# Patient Record
Sex: FEMALE | Race: BLACK OR AFRICAN AMERICAN | NOT HISPANIC OR LATINO | Employment: OTHER | URBAN - METROPOLITAN AREA
[De-identification: names, ages, dates, MRNs, and addresses within clinical notes are randomized per-mention and may not be internally consistent; named-entity substitution may affect disease eponyms.]

---

## 2020-08-17 ENCOUNTER — ANESTHESIA EVENT (OUTPATIENT)
Dept: SURGERY | Facility: OTHER | Age: 65
End: 2020-08-17
Payer: COMMERCIAL

## 2020-08-17 ENCOUNTER — HOSPITAL ENCOUNTER (OUTPATIENT)
Dept: PREADMISSION TESTING | Facility: OTHER | Age: 65
Discharge: HOME | End: 2020-08-17
Attending: ORTHOPAEDIC SURGERY
Payer: COMMERCIAL

## 2020-08-17 VITALS
WEIGHT: 212 LBS | BODY MASS INDEX: 34.07 KG/M2 | TEMPERATURE: 97 F | HEIGHT: 66 IN | DIASTOLIC BLOOD PRESSURE: 65 MMHG | HEART RATE: 60 BPM | SYSTOLIC BLOOD PRESSURE: 142 MMHG | OXYGEN SATURATION: 97 %

## 2020-08-17 LAB
ANION GAP SERPL CALC-SCNC: 11 MMOL/L (ref 8–16)
BASOPHILS # BLD AUTO: 0.05 K/UL (ref 0–0.2)
BASOPHILS NFR BLD: 0.4 % (ref 0–1.9)
BILIRUB UR QL STRIP: NEGATIVE
BUN SERPL-MCNC: 15 MG/DL (ref 8–23)
CALCIUM SERPL-MCNC: 9.6 MG/DL (ref 8.7–10.5)
CHLORIDE SERPL-SCNC: 107 MMOL/L (ref 95–110)
CLARITY UR: CLEAR
CO2 SERPL-SCNC: 24 MMOL/L (ref 23–29)
COLOR UR: YELLOW
CREAT SERPL-MCNC: 0.8 MG/DL (ref 0.5–1.4)
DIFFERENTIAL METHOD: ABNORMAL
EOSINOPHIL # BLD AUTO: 0.3 K/UL (ref 0–0.5)
EOSINOPHIL NFR BLD: 2.1 % (ref 0–8)
ERYTHROCYTE [DISTWIDTH] IN BLOOD BY AUTOMATED COUNT: 13.4 % (ref 11.5–14.5)
EST. GFR  (AFRICAN AMERICAN): >60 ML/MIN/1.73 M^2
EST. GFR  (NON AFRICAN AMERICAN): >60 ML/MIN/1.73 M^2
GLUCOSE SERPL-MCNC: 88 MG/DL (ref 70–110)
GLUCOSE UR QL STRIP: NEGATIVE
HCT VFR BLD AUTO: 39.8 % (ref 37–48.5)
HGB BLD-MCNC: 12.8 G/DL (ref 12–16)
HGB UR QL STRIP: NEGATIVE
IMM GRANULOCYTES # BLD AUTO: 0.05 K/UL (ref 0–0.04)
IMM GRANULOCYTES NFR BLD AUTO: 0.4 % (ref 0–0.5)
KETONES UR QL STRIP: NEGATIVE
LEUKOCYTE ESTERASE UR QL STRIP: NEGATIVE
LYMPHOCYTES # BLD AUTO: 2 K/UL (ref 1–4.8)
LYMPHOCYTES NFR BLD: 16.3 % (ref 18–48)
MCH RBC QN AUTO: 28.1 PG (ref 27–31)
MCHC RBC AUTO-ENTMCNC: 32.2 G/DL (ref 32–36)
MCV RBC AUTO: 88 FL (ref 82–98)
MONOCYTES # BLD AUTO: 1.1 K/UL (ref 0.3–1)
MONOCYTES NFR BLD: 8.5 % (ref 4–15)
NEUTROPHILS # BLD AUTO: 8.9 K/UL (ref 1.8–7.7)
NEUTROPHILS NFR BLD: 72.3 % (ref 38–73)
NITRITE UR QL STRIP: NEGATIVE
NRBC BLD-RTO: 0 /100 WBC
PH UR STRIP: 7 [PH] (ref 5–8)
PLATELET # BLD AUTO: 412 K/UL (ref 150–350)
PMV BLD AUTO: 9.5 FL (ref 9.2–12.9)
POTASSIUM SERPL-SCNC: 4.7 MMOL/L (ref 3.5–5.1)
PROT UR QL STRIP: NEGATIVE
RBC # BLD AUTO: 4.55 M/UL (ref 4–5.4)
SODIUM SERPL-SCNC: 142 MMOL/L (ref 136–145)
SP GR UR STRIP: 1.02 (ref 1–1.03)
URN SPEC COLLECT METH UR: NORMAL
UROBILINOGEN UR STRIP-ACNC: NEGATIVE EU/DL
WBC # BLD AUTO: 12.35 K/UL (ref 3.9–12.7)

## 2020-08-17 PROCEDURE — 36415 COLL VENOUS BLD VENIPUNCTURE: CPT

## 2020-08-17 PROCEDURE — 80048 BASIC METABOLIC PNL TOTAL CA: CPT

## 2020-08-17 PROCEDURE — 81003 URINALYSIS AUTO W/O SCOPE: CPT

## 2020-08-17 PROCEDURE — 85025 COMPLETE CBC W/AUTO DIFF WBC: CPT

## 2020-08-17 RX ORDER — HYDROCODONE BITARTRATE AND ACETAMINOPHEN 7.5; 325 MG/1; MG/1
1 TABLET ORAL EVERY 6 HOURS PRN
COMMUNITY

## 2020-08-17 RX ORDER — NEBIVOLOL 10 MG/1
20 TABLET ORAL DAILY
COMMUNITY

## 2020-08-17 RX ORDER — RANOLAZINE 500 MG/1
500 TABLET, EXTENDED RELEASE ORAL 2 TIMES DAILY
COMMUNITY

## 2020-08-17 RX ORDER — LISINOPRIL 5 MG/1
5 TABLET ORAL DAILY
COMMUNITY

## 2020-08-17 RX ORDER — DILTIAZEM HYDROCHLORIDE 180 MG/1
180 CAPSULE, EXTENDED RELEASE ORAL DAILY
COMMUNITY

## 2020-08-17 RX ORDER — LISINOPRIL 10 MG/1
10 TABLET ORAL DAILY
COMMUNITY
End: 2020-08-17 | Stop reason: CLARIF

## 2020-08-17 RX ORDER — GLIMEPIRIDE 2 MG/1
2 TABLET ORAL 2 TIMES DAILY
COMMUNITY

## 2020-08-17 RX ORDER — ACETAMINOPHEN 500 MG
1000 TABLET ORAL
Status: CANCELLED | OUTPATIENT
Start: 2020-08-17 | End: 2020-08-17

## 2020-08-17 RX ORDER — LIDOCAINE HYDROCHLORIDE 10 MG/ML
0.5 INJECTION, SOLUTION EPIDURAL; INFILTRATION; INTRACAUDAL; PERINEURAL ONCE
Status: CANCELLED | OUTPATIENT
Start: 2020-08-17 | End: 2020-08-17

## 2020-08-17 RX ORDER — METFORMIN HYDROCHLORIDE EXTENDED-RELEASE TABLETS 500 MG/1
500 TABLET, FILM COATED, EXTENDED RELEASE ORAL 2 TIMES DAILY WITH MEALS
Status: ON HOLD | COMMUNITY
End: 2020-12-04 | Stop reason: SDUPTHER

## 2020-08-17 RX ORDER — SODIUM CHLORIDE, SODIUM LACTATE, POTASSIUM CHLORIDE, CALCIUM CHLORIDE 600; 310; 30; 20 MG/100ML; MG/100ML; MG/100ML; MG/100ML
INJECTION, SOLUTION INTRAVENOUS CONTINUOUS
Status: CANCELLED | OUTPATIENT
Start: 2020-08-17

## 2020-08-17 RX ORDER — SIMVASTATIN 40 MG/1
40 TABLET, FILM COATED ORAL NIGHTLY
COMMUNITY

## 2020-08-17 RX ORDER — PROMETHAZINE HYDROCHLORIDE 25 MG/1
25 TABLET ORAL
COMMUNITY

## 2020-08-17 NOTE — ANESTHESIA PREPROCEDURE EVALUATION
08/17/2020  Haylie Jameson is a 65 y.o., female.    Anesthesia Evaluation    I have reviewed the Patient Summary Reports.    I have reviewed the Nursing Notes. I have reviewed the NPO Status.   I have reviewed the Medications.     Review of Systems  Anesthesia Hx:  Denies Family Hx of Anesthesia complications.   Denies Personal Hx of Anesthesia complications.   Hematology/Oncology:  Hematology Normal       -- Cancer in past history (Lung):    Cardiovascular:   Exercise tolerance: poor Hypertension CAD   See cardiology annually.  Will get old records   Pulmonary:   S/p pulmonary wedge resection on R   Renal/:  Renal/ Normal     Hepatic/GI:  Hepatic/GI Normal    Musculoskeletal:   Arthritis     Neurological:  Neurology Normal    Endocrine:   Diabetes        Physical Exam  General:  Obesity    Airway/Jaw/Neck:  Airway Findings: Mouth Opening: Normal Tongue: Normal  General Airway Assessment: Adult  Mallampati: II  TM Distance: Normal, at least 6 cm      Dental:  Dental Findings: In tact, Upper Dentures   Chest/Lungs:  Chest/Lungs Clear    Heart/Vascular:  Heart Findings: Normal       Mental Status:  Mental Status Findings:  Cooperative         Anesthesia Plan  Type of Anesthesia, risks & benefits discussed:  Anesthesia Type:  spinal  Patient's Preference:   Intra-op Monitoring Plan: standard ASA monitors  Intra-op Monitoring Plan Comments:   Post Op Pain Control Plan: per primary service following discharge from PACU  Post Op Pain Control Plan Comments:   Induction:    Beta Blocker:         Informed Consent: Patient understands risks and agrees with Anesthesia plan.  Questions answered. Anesthesia consent signed with patient.  ASA Score: 3     Day of Surgery Review of History & Physical:    H&P update referred to the surgeon.     Anesthesia Plan Notes: Labs/EKG.  Pt is Hoahaoism.  NO PRBC's,  albumin OK  Need cardiology records  Addendum:Cardiology note on paper chart [facility]        Ready For Surgery From Anesthesia Perspective.

## 2020-08-17 NOTE — DISCHARGE INSTRUCTIONS
Information to Prepare you for your Surgery    PRE-ADMIT TESTING -  [phone]    [address]  [place] [facility]          Your surgery has been scheduled at [facility]. We are pleased to have the opportunity to serve you. For Further Information please call [phone].    On the day of surgery please report to the Information Desk on the 1st floor.    · CONTACT YOUR PHYSICIAN'S OFFICE THE DAY PRIOR TO YOUR SURGERY TO OBTAIN YOUR ARRIVAL TIME.     · The evening before surgery do not eat anything after 9 p.m. ( this includes hard candy, chewing gum and mints).  You may only have GATORADE, POWERADE AND WATER  from 9 p.m. until you leave your home.   DO NOT DRINK ANY LIQUIDS ON THE WAY TO THE HOSPITAL.      SPECIAL MEDICATION INSTRUCTIONS: TAKE medications checked off by the Anesthesiologist on your Medication List.    Angiogram Patients: Take medications as instructed by your physician, including aspirin.     Surgery Patients:    If you take ASPIRIN - Your PHYSICIAN/SURGEON will need to inform you IF/OR when you need to stop taking aspirin prior to your surgery.     Do Not take any medications containing IBUPROFEN.  Do Not Wear any make-up or dark nail polish   (especially eye make-up) to surgery. If you come to surgery with makeup on you will be required to remove the makeup or nail polish.    Do not shave your surgical area at least 5 days prior to your surgery. The surgical prep will be performed at the hospital according to Infection Control regulations.    Leave all valuables at home.   Do Not wear any jewelry or watches, including any metal in body piercings. Jewelry must be removed prior to coming to the hospital.  There is a possibility that rings that are unable to be removed may be cut off if they are on the surgical extremity.    Contact Lens must be removed before surgery. Either do not wear the contact lens or bring a case and solution for  storage.  Please bring a container for eyeglasses or dentures as required.  Bring any paperwork your physician has provided, such as consent forms,  history and physicals, doctor's orders, etc.   Bring comfortable clothes that are loose fitting to wear upon discharge. Take into consideration the type of surgery being performed.  Maintain your diet as advised per your physician the day prior to surgery.      Adequate rest the night before surgery is advised.   Park in the Parking lot behind the hospital or in the [facility] across the street from the parking lot. Parking is complimentary.  If you will be discharged the same day as your procedure, please arrange for a responsible adult to drive you home or to accompany you if traveling by taxi.   YOU WILL NOT BE PERMITTED TO DRIVE OR TO LEAVE THE HOSPITAL ALONE AFTER SURGERY.   If you are being discharged the same day, it is strongly recommended that you arrange for someone to remain with you for the first 24 hrs following your surgery.    The Surgeon will speak to your family/visitor after your surgery regarding the outcome of your surgery and post op care.  The Surgeon may speak to you after your surgery, but there is a possibility you may not remember the details.  Please check with your family members regarding the conversation with the Surgeon.    We strongly recommend whoever is bringing you home be present for discharge instructions.  This will ensure a thorough understanding for your post op home care.    ALL CHILDREN MUST ALWAYS BE ACCOMPANIED BY AN ADULT.    Visitors-Refer to current Visitor policy handouts.    Thank you for your cooperation.  The Staff of [facility].                Bathing Instructions with Hibiclens     Shower the evening before and morning of your procedure with Hibiclens:   Wash your face with water and your regular face wash/soap   Apply Hibiclens directly on your skin or on a wet washcloth and wash  gently. When showering: Move away from the shower stream when applying Hibiclens to avoid rinsing off too soon.   Rinse thoroughly with warm water   Do not dilute Hibiclens         Dry off as usual, do not use any deodorant, powder, body lotions, perfume, after shave or cologne.

## 2020-08-17 NOTE — H&P
Subjective:     Worsening pain deformity left knee intolerable at this point admitted for left knee replacement.  Jehovah Witness    There are no active problems to display for this patient.    No past medical history on file.   No past surgical history on file.   No medications prior to admission.     Review of patient's allergies indicates:  No Known Allergies   Social History     Tobacco Use    Smoking status: Not on file   Substance Use Topics    Alcohol use: Not on file      No family history on file.   Review of Systems  Pertinent items are noted in HPI.    Objective:     No data found.  Heart regular lungs clear clear crepitance tenderness left knee limping    Imaging Review  Loss of joint space sclerosis osteophyte formation    Assessment:     There are no hospital problems to display for this patient.      Plan:     The various methods of treatment have been discussed with the patient and family.   After consideration of risks, benefits and other options for treatment, the patient has consented to surgical interventions (significant risk discussed especially with Jehovah Witness).  Questions were answered and Pre-op teaching was done by me.

## 2020-08-21 NOTE — DISCHARGE INSTRUCTIONS
Knee Athroscopy Discharge Instructions    1) Pain: After surgery your knee will be sore. The knee will likely have been injected with a numbing medicine (Exparel) prior to completion of surgery for pain control. This is indicated on a green bracelet that you will continue to wear for 4 days after surgery. You will   also get a prescription for pain control before you leave the hospital. Ice and elevation will assist with pain control.    a) Apply ice as much as possible for the first 72 hours. After 72 hours, apply ice for 20minutes 3-4 times a day, after therapy,after exercising or whenever experiencing pain. Avoid direct skin contact with ice to prevent frostbit.          b) Elevate the affected leg with the pillow the length of the leg  to assist with swelling and pain.  2) Incision Care:  a) Some drainage from the incision in the first 72 hours is normal. If drainage is excessive,remove bandage,  pat dry, cover with sterile gauze and secure with tape. Notify physician about excessive drainage. Staples will be removed 14 days after surgery   3) Activity:  a) Perform exercises 2-3 x day.  b)  No tub or hot tub usage.DR KEEN PATIENTS CANNOT SHOWER UNTIL STAPLES ARE REMOVED. Support help is mandatory during showering. If the dressing becomes wet, replace with a new dressing.   c) Wear thigh high yadira hose stockings for 3 weeks after surgery .You may remove stockings for 1- 2 hours during the day only. Send patient home with an extra pair yadira hose.If your physician orders the CPM machine you are to use it for 2 hours in the am and 2 hours in the pm.Increase the flexion 5 degrees each session if tolerated. This is not to replace your exercise program.  4) For lifetime after your replacement surgery, you may need antibiotic coverage before dental or minor surgical procedures.  5) Possible Complications: Call Surgeon  a) Infection: Report these signs and symptoms to your surgeon.  i) Unexpected redness around  incision   ii) Persistent drainage from wound after 72 hours.  iii) Temperature ,can be treated with Tylenol. Do not go to the emergency room or urgent care center, call your surgeon.   iv) Additional swelling  v) Pain not controlled with current pain medication  b) Blood Clot: Report theses signs and symptoms to your surgeon  i) Unusual pain  ii) Red or discolored skin  iii) Swelling in the leg  iv) Unusual warm skin

## 2020-08-24 ENCOUNTER — CLINICAL SUPPORT (OUTPATIENT)
Dept: URGENT CARE | Facility: CLINIC | Age: 65
End: 2020-08-24
Payer: MEDICARE

## 2020-08-24 VITALS — HEART RATE: 84 BPM | OXYGEN SATURATION: 97 % | TEMPERATURE: 98 F

## 2020-08-24 DIAGNOSIS — Z01.818 PREOP EXAMINATION: ICD-10-CM

## 2020-08-24 PROCEDURE — U0003 INFECTIOUS AGENT DETECTION BY NUCLEIC ACID (DNA OR RNA); SEVERE ACUTE RESPIRATORY SYNDROME CORONAVIRUS 2 (SARS-COV-2) (CORONAVIRUS DISEASE [COVID-19]), AMPLIFIED PROBE TECHNIQUE, MAKING USE OF HIGH THROUGHPUT TECHNOLOGIES AS DESCRIBED BY CMS-2020-01-R: HCPCS

## 2020-08-25 ENCOUNTER — TELEPHONE (OUTPATIENT)
Dept: URGENT CARE | Facility: CLINIC | Age: 65
End: 2020-08-25

## 2020-08-25 LAB — SARS-COV-2 RNA RESP QL NAA+PROBE: NOT DETECTED

## 2020-08-27 ENCOUNTER — ANESTHESIA (OUTPATIENT)
Dept: SURGERY | Facility: OTHER | Age: 65
End: 2020-08-27
Payer: COMMERCIAL

## 2020-08-27 ENCOUNTER — HOSPITAL ENCOUNTER (OUTPATIENT)
Facility: OTHER | Age: 65
Discharge: HOME HEALTH | End: 2020-08-28
Attending: ORTHOPAEDIC SURGERY | Admitting: ORTHOPAEDIC SURGERY
Payer: MEDICARE

## 2020-08-27 DIAGNOSIS — Z01.818 PREOP TESTING: ICD-10-CM

## 2020-08-27 DIAGNOSIS — R26.81 GAIT INSTABILITY: Primary | ICD-10-CM

## 2020-08-27 LAB
POCT GLUCOSE: 133 MG/DL (ref 70–110)
POCT GLUCOSE: 139 MG/DL (ref 70–110)
POCT GLUCOSE: 251 MG/DL (ref 70–110)

## 2020-08-27 PROCEDURE — C1713 ANCHOR/SCREW BN/BN,TIS/BN: HCPCS | Performed by: ORTHOPAEDIC SURGERY

## 2020-08-27 PROCEDURE — 63600175 PHARM REV CODE 636 W HCPCS: Performed by: ANESTHESIOLOGY

## 2020-08-27 PROCEDURE — 25000003 PHARM REV CODE 250: Performed by: NURSE PRACTITIONER

## 2020-08-27 PROCEDURE — 71000033 HC RECOVERY, INTIAL HOUR: Performed by: ORTHOPAEDIC SURGERY

## 2020-08-27 PROCEDURE — 36000710: Performed by: ORTHOPAEDIC SURGERY

## 2020-08-27 PROCEDURE — 63600175 PHARM REV CODE 636 W HCPCS: Performed by: ORTHOPAEDIC SURGERY

## 2020-08-27 PROCEDURE — 71000039 HC RECOVERY, EACH ADD'L HOUR: Performed by: ORTHOPAEDIC SURGERY

## 2020-08-27 PROCEDURE — C9290 INJ, BUPIVACAINE LIPOSOME: HCPCS | Performed by: ORTHOPAEDIC SURGERY

## 2020-08-27 PROCEDURE — 25000003 PHARM REV CODE 250: Performed by: ORTHOPAEDIC SURGERY

## 2020-08-27 PROCEDURE — C1776 JOINT DEVICE (IMPLANTABLE): HCPCS | Performed by: ORTHOPAEDIC SURGERY

## 2020-08-27 PROCEDURE — 94761 N-INVAS EAR/PLS OXIMETRY MLT: CPT

## 2020-08-27 PROCEDURE — 36000711: Performed by: ORTHOPAEDIC SURGERY

## 2020-08-27 PROCEDURE — 37000008 HC ANESTHESIA 1ST 15 MINUTES: Performed by: ORTHOPAEDIC SURGERY

## 2020-08-27 PROCEDURE — 63600175 PHARM REV CODE 636 W HCPCS: Performed by: NURSE ANESTHETIST, CERTIFIED REGISTERED

## 2020-08-27 PROCEDURE — 97161 PT EVAL LOW COMPLEX 20 MIN: CPT

## 2020-08-27 PROCEDURE — 25000003 PHARM REV CODE 250: Performed by: ANESTHESIOLOGY

## 2020-08-27 PROCEDURE — 97116 GAIT TRAINING THERAPY: CPT | Mod: CQ

## 2020-08-27 PROCEDURE — 97110 THERAPEUTIC EXERCISES: CPT | Mod: CQ

## 2020-08-27 PROCEDURE — 37000009 HC ANESTHESIA EA ADD 15 MINS: Performed by: ORTHOPAEDIC SURGERY

## 2020-08-27 PROCEDURE — 64447 NJX AA&/STRD FEMORAL NRV IMG: CPT | Performed by: SPECIALIST

## 2020-08-27 PROCEDURE — 27201423 OPTIME MED/SURG SUP & DEVICES STERILE SUPPLY: Performed by: ORTHOPAEDIC SURGERY

## 2020-08-27 PROCEDURE — 82962 GLUCOSE BLOOD TEST: CPT | Performed by: ORTHOPAEDIC SURGERY

## 2020-08-27 PROCEDURE — 63600175 PHARM REV CODE 636 W HCPCS: Performed by: SPECIALIST

## 2020-08-27 PROCEDURE — 94799 UNLISTED PULMONARY SVC/PX: CPT

## 2020-08-27 DEVICE — IMPLANTABLE DEVICE
Type: IMPLANTABLE DEVICE | Site: KNEE | Status: FUNCTIONAL
Brand: VANGUARD® KNEE SYSTEM

## 2020-08-27 DEVICE — PATELLA COMPONENT3PEG 34X8.5MM: Type: IMPLANTABLE DEVICE | Site: KNEE | Status: FUNCTIONAL

## 2020-08-27 DEVICE — TIBIAL TRAY CRUCIATE 71MM: Type: IMPLANTABLE DEVICE | Site: KNEE | Status: FUNCTIONAL

## 2020-08-27 DEVICE — CEMENT REFOBACIN BCR 1X40: Type: IMPLANTABLE DEVICE | Site: KNEE | Status: FUNCTIONAL

## 2020-08-27 RX ORDER — SODIUM CHLORIDE 9 MG/ML
INJECTION, SOLUTION INTRAVENOUS CONTINUOUS
Status: DISCONTINUED | OUTPATIENT
Start: 2020-08-27 | End: 2020-08-27

## 2020-08-27 RX ORDER — DILTIAZEM HYDROCHLORIDE 180 MG/1
180 CAPSULE, COATED, EXTENDED RELEASE ORAL DAILY
Status: DISCONTINUED | OUTPATIENT
Start: 2020-08-27 | End: 2020-08-27

## 2020-08-27 RX ORDER — LISINOPRIL 5 MG/1
5 TABLET ORAL DAILY
Status: DISCONTINUED | OUTPATIENT
Start: 2020-08-27 | End: 2020-08-28 | Stop reason: HOSPADM

## 2020-08-27 RX ORDER — FAMOTIDINE 20 MG/1
20 TABLET, FILM COATED ORAL DAILY
Status: DISCONTINUED | OUTPATIENT
Start: 2020-08-27 | End: 2020-08-28 | Stop reason: HOSPADM

## 2020-08-27 RX ORDER — TRANEXAMIC ACID 100 MG/ML
INJECTION, SOLUTION INTRAVENOUS
Status: DISCONTINUED | OUTPATIENT
Start: 2020-08-27 | End: 2020-08-27 | Stop reason: HOSPADM

## 2020-08-27 RX ORDER — MORPHINE SULFATE 4 MG/ML
4 INJECTION, SOLUTION INTRAMUSCULAR; INTRAVENOUS
Status: DISCONTINUED | OUTPATIENT
Start: 2020-08-27 | End: 2020-08-28

## 2020-08-27 RX ORDER — DIPHENHYDRAMINE HYDROCHLORIDE 50 MG/ML
25 INJECTION INTRAMUSCULAR; INTRAVENOUS EVERY 6 HOURS PRN
Status: DISCONTINUED | OUTPATIENT
Start: 2020-08-27 | End: 2020-08-27 | Stop reason: HOSPADM

## 2020-08-27 RX ORDER — SODIUM CHLORIDE, SODIUM LACTATE, POTASSIUM CHLORIDE, CALCIUM CHLORIDE 600; 310; 30; 20 MG/100ML; MG/100ML; MG/100ML; MG/100ML
INJECTION, SOLUTION INTRAVENOUS CONTINUOUS
Status: DISCONTINUED | OUTPATIENT
Start: 2020-08-27 | End: 2020-08-27

## 2020-08-27 RX ORDER — MUPIROCIN 20 MG/G
1 OINTMENT TOPICAL 2 TIMES DAILY
Status: DISCONTINUED | OUTPATIENT
Start: 2020-08-27 | End: 2020-08-28 | Stop reason: HOSPADM

## 2020-08-27 RX ORDER — SODIUM CHLORIDE 0.9 % (FLUSH) 0.9 %
5 SYRINGE (ML) INJECTION
Status: DISCONTINUED | OUTPATIENT
Start: 2020-08-27 | End: 2020-08-28 | Stop reason: HOSPADM

## 2020-08-27 RX ORDER — MEPERIDINE HYDROCHLORIDE 25 MG/ML
12.5 INJECTION INTRAMUSCULAR; INTRAVENOUS; SUBCUTANEOUS ONCE AS NEEDED
Status: DISCONTINUED | OUTPATIENT
Start: 2020-08-27 | End: 2020-08-27 | Stop reason: HOSPADM

## 2020-08-27 RX ORDER — NEBIVOLOL 10 MG/1
20 TABLET ORAL DAILY
Status: DISCONTINUED | OUTPATIENT
Start: 2020-08-27 | End: 2020-08-28 | Stop reason: HOSPADM

## 2020-08-27 RX ORDER — ACETAMINOPHEN 500 MG
1000 TABLET ORAL
Status: COMPLETED | OUTPATIENT
Start: 2020-08-27 | End: 2020-08-27

## 2020-08-27 RX ORDER — METFORMIN HYDROCHLORIDE 500 MG/1
500 TABLET ORAL
Status: DISCONTINUED | OUTPATIENT
Start: 2020-08-28 | End: 2020-08-28 | Stop reason: HOSPADM

## 2020-08-27 RX ORDER — CEFAZOLIN SODIUM 2 G/50ML
2 SOLUTION INTRAVENOUS
Status: COMPLETED | OUTPATIENT
Start: 2020-08-27 | End: 2020-08-28

## 2020-08-27 RX ORDER — PROMETHAZINE HYDROCHLORIDE 12.5 MG/1
25 TABLET ORAL EVERY 6 HOURS PRN
Status: DISCONTINUED | OUTPATIENT
Start: 2020-08-27 | End: 2020-08-28 | Stop reason: HOSPADM

## 2020-08-27 RX ORDER — HYDROCODONE BITARTRATE AND ACETAMINOPHEN 10; 325 MG/1; MG/1
1 TABLET ORAL EVERY 4 HOURS PRN
Status: DISCONTINUED | OUTPATIENT
Start: 2020-08-27 | End: 2020-08-28 | Stop reason: HOSPADM

## 2020-08-27 RX ORDER — INSULIN ASPART 100 [IU]/ML
0-5 INJECTION, SOLUTION INTRAVENOUS; SUBCUTANEOUS
Status: DISCONTINUED | OUTPATIENT
Start: 2020-08-27 | End: 2020-08-28 | Stop reason: HOSPADM

## 2020-08-27 RX ORDER — SIMVASTATIN 10 MG/1
40 TABLET, FILM COATED ORAL NIGHTLY
Status: DISCONTINUED | OUTPATIENT
Start: 2020-08-27 | End: 2020-08-28 | Stop reason: HOSPADM

## 2020-08-27 RX ORDER — FENTANYL CITRATE 50 UG/ML
100 INJECTION, SOLUTION INTRAMUSCULAR; INTRAVENOUS EVERY 5 MIN PRN
Status: COMPLETED | OUTPATIENT
Start: 2020-08-27 | End: 2020-08-27

## 2020-08-27 RX ORDER — DILTIAZEM HYDROCHLORIDE 180 MG/1
180 CAPSULE, COATED, EXTENDED RELEASE ORAL DAILY
Status: DISCONTINUED | OUTPATIENT
Start: 2020-08-27 | End: 2020-08-28 | Stop reason: HOSPADM

## 2020-08-27 RX ORDER — GLIMEPIRIDE 2 MG/1
2 TABLET ORAL
Status: DISCONTINUED | OUTPATIENT
Start: 2020-08-28 | End: 2020-08-28 | Stop reason: HOSPADM

## 2020-08-27 RX ORDER — OXYCODONE HYDROCHLORIDE 5 MG/1
5 TABLET ORAL
Status: DISCONTINUED | OUTPATIENT
Start: 2020-08-27 | End: 2020-08-27 | Stop reason: HOSPADM

## 2020-08-27 RX ORDER — GLUCAGON 1 MG
1 KIT INJECTION
Status: DISCONTINUED | OUTPATIENT
Start: 2020-08-27 | End: 2020-08-28 | Stop reason: HOSPADM

## 2020-08-27 RX ORDER — PROPOFOL 10 MG/ML
VIAL (ML) INTRAVENOUS
Status: DISCONTINUED | OUTPATIENT
Start: 2020-08-27 | End: 2020-08-27

## 2020-08-27 RX ORDER — MIDAZOLAM HYDROCHLORIDE 1 MG/ML
2 INJECTION INTRAMUSCULAR; INTRAVENOUS
Status: COMPLETED | OUTPATIENT
Start: 2020-08-27 | End: 2020-08-27

## 2020-08-27 RX ORDER — DEXTROSE MONOHYDRATE AND SODIUM CHLORIDE 5; .9 G/100ML; G/100ML
INJECTION, SOLUTION INTRAVENOUS CONTINUOUS
Status: DISCONTINUED | OUTPATIENT
Start: 2020-08-27 | End: 2020-08-27

## 2020-08-27 RX ORDER — NAPROXEN SODIUM 220 MG/1
81 TABLET, FILM COATED ORAL 2 TIMES DAILY
Status: DISCONTINUED | OUTPATIENT
Start: 2020-08-27 | End: 2020-08-28 | Stop reason: HOSPADM

## 2020-08-27 RX ORDER — ONDANSETRON 8 MG/1
8 TABLET, ORALLY DISINTEGRATING ORAL EVERY 8 HOURS PRN
Status: DISCONTINUED | OUTPATIENT
Start: 2020-08-27 | End: 2020-08-28 | Stop reason: HOSPADM

## 2020-08-27 RX ORDER — SODIUM CHLORIDE 9 MG/ML
INJECTION, SOLUTION INTRAVENOUS CONTINUOUS
Status: DISCONTINUED | OUTPATIENT
Start: 2020-08-27 | End: 2020-08-28 | Stop reason: HOSPADM

## 2020-08-27 RX ORDER — RANOLAZINE 500 MG/1
500 TABLET, EXTENDED RELEASE ORAL 2 TIMES DAILY
Status: DISCONTINUED | OUTPATIENT
Start: 2020-08-27 | End: 2020-08-28 | Stop reason: HOSPADM

## 2020-08-27 RX ORDER — CEFAZOLIN SODIUM 2 G/50ML
2 SOLUTION INTRAVENOUS
Status: COMPLETED | OUTPATIENT
Start: 2020-08-27 | End: 2020-08-27

## 2020-08-27 RX ORDER — IBUPROFEN 200 MG
24 TABLET ORAL
Status: DISCONTINUED | OUTPATIENT
Start: 2020-08-27 | End: 2020-08-28 | Stop reason: HOSPADM

## 2020-08-27 RX ORDER — IBUPROFEN 200 MG
16 TABLET ORAL
Status: DISCONTINUED | OUTPATIENT
Start: 2020-08-27 | End: 2020-08-28 | Stop reason: HOSPADM

## 2020-08-27 RX ORDER — HYDROCODONE BITARTRATE AND ACETAMINOPHEN 5; 325 MG/1; MG/1
1 TABLET ORAL EVERY 4 HOURS PRN
Status: DISCONTINUED | OUTPATIENT
Start: 2020-08-27 | End: 2020-08-28 | Stop reason: HOSPADM

## 2020-08-27 RX ORDER — PROPOFOL 10 MG/ML
VIAL (ML) INTRAVENOUS CONTINUOUS PRN
Status: DISCONTINUED | OUTPATIENT
Start: 2020-08-27 | End: 2020-08-27

## 2020-08-27 RX ORDER — HYDROMORPHONE HYDROCHLORIDE 2 MG/ML
0.4 INJECTION, SOLUTION INTRAMUSCULAR; INTRAVENOUS; SUBCUTANEOUS EVERY 5 MIN PRN
Status: DISCONTINUED | OUTPATIENT
Start: 2020-08-27 | End: 2020-08-27 | Stop reason: HOSPADM

## 2020-08-27 RX ORDER — SODIUM CHLORIDE 0.9 % (FLUSH) 0.9 %
3 SYRINGE (ML) INJECTION
Status: DISCONTINUED | OUTPATIENT
Start: 2020-08-27 | End: 2020-08-28 | Stop reason: HOSPADM

## 2020-08-27 RX ORDER — ONDANSETRON 2 MG/ML
4 INJECTION INTRAMUSCULAR; INTRAVENOUS DAILY PRN
Status: DISCONTINUED | OUTPATIENT
Start: 2020-08-27 | End: 2020-08-27 | Stop reason: HOSPADM

## 2020-08-27 RX ORDER — MELATONIN 1 MG/ML
8 LIQUID (ML) ORAL NIGHTLY PRN
Status: DISCONTINUED | OUTPATIENT
Start: 2020-08-27 | End: 2020-08-28 | Stop reason: HOSPADM

## 2020-08-27 RX ORDER — LIDOCAINE HYDROCHLORIDE 10 MG/ML
0.5 INJECTION, SOLUTION EPIDURAL; INFILTRATION; INTRACAUDAL; PERINEURAL ONCE
Status: DISCONTINUED | OUTPATIENT
Start: 2020-08-27 | End: 2020-08-27 | Stop reason: HOSPADM

## 2020-08-27 RX ORDER — POLYETHYLENE GLYCOL 3350 17 G/17G
17 POWDER, FOR SOLUTION ORAL DAILY
Status: DISCONTINUED | OUTPATIENT
Start: 2020-08-27 | End: 2020-08-28 | Stop reason: HOSPADM

## 2020-08-27 RX ORDER — ROPIVACAINE HYDROCHLORIDE 5 MG/ML
INJECTION, SOLUTION EPIDURAL; INFILTRATION; PERINEURAL
Status: DISCONTINUED | OUTPATIENT
Start: 2020-08-27 | End: 2020-08-27

## 2020-08-27 RX ORDER — PHENYLEPHRINE HYDROCHLORIDE 10 MG/ML
INJECTION INTRAVENOUS
Status: DISCONTINUED | OUTPATIENT
Start: 2020-08-27 | End: 2020-08-27

## 2020-08-27 RX ADMIN — PHENYLEPHRINE HYDROCHLORIDE 100 MCG: 10 INJECTION INTRAVENOUS at 09:08

## 2020-08-27 RX ADMIN — MORPHINE SULFATE 4 MG: 4 INJECTION, SOLUTION INTRAMUSCULAR; INTRAVENOUS at 02:08

## 2020-08-27 RX ADMIN — HYDROCODONE BITARTRATE AND ACETAMINOPHEN 1 TABLET: 10; 325 TABLET ORAL at 06:08

## 2020-08-27 RX ADMIN — POLYETHYLENE GLYCOL 3350 17 G: 17 POWDER, FOR SOLUTION ORAL at 03:08

## 2020-08-27 RX ADMIN — ROPIVACAINE HYDROCHLORIDE 3 ML: 5 INJECTION, SOLUTION EPIDURAL; INFILTRATION; PERINEURAL at 08:08

## 2020-08-27 RX ADMIN — MUPIROCIN 1 G: 20 OINTMENT TOPICAL at 09:08

## 2020-08-27 RX ADMIN — DILTIAZEM HYDROCHLORIDE 180 MG: 180 CAPSULE, COATED, EXTENDED RELEASE ORAL at 09:08

## 2020-08-27 RX ADMIN — ASPIRIN 81 MG CHEWABLE TABLET 81 MG: 81 TABLET CHEWABLE at 09:08

## 2020-08-27 RX ADMIN — SODIUM CHLORIDE, SODIUM LACTATE, POTASSIUM CHLORIDE, AND CALCIUM CHLORIDE: 600; 310; 30; 20 INJECTION, SOLUTION INTRAVENOUS at 08:08

## 2020-08-27 RX ADMIN — SODIUM CHLORIDE, SODIUM LACTATE, POTASSIUM CHLORIDE, AND CALCIUM CHLORIDE: 600; 310; 30; 20 INJECTION, SOLUTION INTRAVENOUS at 09:08

## 2020-08-27 RX ADMIN — RANOLAZINE 500 MG: 500 TABLET, FILM COATED, EXTENDED RELEASE ORAL at 10:08

## 2020-08-27 RX ADMIN — MIDAZOLAM HYDROCHLORIDE 2 MG: 1 INJECTION, SOLUTION INTRAMUSCULAR; INTRAVENOUS at 08:08

## 2020-08-27 RX ADMIN — PROPOFOL 20 MG: 10 INJECTION, EMULSION INTRAVENOUS at 08:08

## 2020-08-27 RX ADMIN — RANOLAZINE 500 MG: 500 TABLET, FILM COATED, EXTENDED RELEASE ORAL at 03:08

## 2020-08-27 RX ADMIN — ACETAMINOPHEN 1000 MG: 500 TABLET, FILM COATED ORAL at 07:08

## 2020-08-27 RX ADMIN — MUPIROCIN 1 G: 20 OINTMENT TOPICAL at 03:08

## 2020-08-27 RX ADMIN — ASPIRIN 81 MG CHEWABLE TABLET 81 MG: 81 TABLET CHEWABLE at 03:08

## 2020-08-27 RX ADMIN — SIMVASTATIN 40 MG: 10 TABLET, FILM COATED ORAL at 09:08

## 2020-08-27 RX ADMIN — ROPIVACAINE HYDROCHLORIDE 30 ML: 5 INJECTION, SOLUTION EPIDURAL; INFILTRATION; PERINEURAL at 08:08

## 2020-08-27 RX ADMIN — MORPHINE SULFATE 4 MG: 4 INJECTION, SOLUTION INTRAMUSCULAR; INTRAVENOUS at 05:08

## 2020-08-27 RX ADMIN — SODIUM CHLORIDE: 0.9 INJECTION, SOLUTION INTRAVENOUS at 12:08

## 2020-08-27 RX ADMIN — CEFAZOLIN SODIUM 2 G: 2 SOLUTION INTRAVENOUS at 08:08

## 2020-08-27 RX ADMIN — PROPOFOL 100 MCG/KG/MIN: 10 INJECTION, EMULSION INTRAVENOUS at 08:08

## 2020-08-27 RX ADMIN — HYDROCODONE BITARTRATE AND ACETAMINOPHEN 1 TABLET: 10; 325 TABLET ORAL at 10:08

## 2020-08-27 RX ADMIN — FENTANYL CITRATE 100 MCG: 50 INJECTION, SOLUTION INTRAMUSCULAR; INTRAVENOUS at 08:08

## 2020-08-27 RX ADMIN — NEBIVOLOL HYDROCHLORIDE 20 MG: 10 TABLET ORAL at 03:08

## 2020-08-27 RX ADMIN — LISINOPRIL 5 MG: 5 TABLET ORAL at 03:08

## 2020-08-27 RX ADMIN — CEFAZOLIN SODIUM 2 G: 2 SOLUTION INTRAVENOUS at 05:08

## 2020-08-27 RX ADMIN — FAMOTIDINE 20 MG: 20 TABLET, FILM COATED ORAL at 03:08

## 2020-08-27 RX ADMIN — HYDROCODONE BITARTRATE AND ACETAMINOPHEN 1 TABLET: 10; 325 TABLET ORAL at 01:08

## 2020-08-27 RX ADMIN — ONDANSETRON 8 MG: 8 TABLET, ORALLY DISINTEGRATING ORAL at 02:08

## 2020-08-27 NOTE — CONSULTS
Consult Note  MED    Consult Requested By: Bassam Pardo MD  Reason for Consult: HTN/DM/Yarsani/CAD    SUBJECTIVE:     History of Present Illness:  Patient is a 65 y.o. female presents with left knee repair.  Seen post op in PACU.  VS noted.  Discussed with Ortho.  Pre-op/EPIC reviewed.  No CP/SOB/N/V/D/F/C.      Past Medical History:   Diagnosis Date    Cancer 2018    lung cancer    Diabetes mellitus     Hypertension     Refusal of blood transfusions as patient is Yarsani      Past Surgical History:   Procedure Laterality Date    APPENDECTOMY       SECTION      x 3    GALLBLADDER SURGERY      LUNG CANCER SURGERY Right     partial lobectomy    TUBAL LIGATION       History reviewed. No pertinent family history.  Social History     Tobacco Use    Smoking status: Never Smoker    Smokeless tobacco: Never Used   Substance Use Topics    Alcohol use: Never     Frequency: Never    Drug use: Not on file       Review of patient's allergies indicates:  No Known Allergies     Review of Systems:  Constitutional: No fever or chills  Respiratory: No cough or shortness of breath  Cardiovascular: No chest pain or palpitations  Gastrointestinal: No nausea or vomiting  Neurological: No confusion or weakness    OBJECTIVE:     Vital Signs (Most Recent)  Temp: 97.3 °F (36.3 °C) (20 1038)  Pulse: 77 (20 1038)  Resp: 16 (20 1038)  BP: 133/63 (20 1038)  SpO2: 100 % (20 1038)    Vital Signs Range (Last 24H):  Temp:  [97.3 °F (36.3 °C)-97.7 °F (36.5 °C)]   Pulse:  [68-77]   Resp:  [16]   BP: (133-174)/(63-72)   SpO2:  [99 %-100 %]       Intake/Output Summary (Last 24 hours) at 2020 1046  Last data filed at 2020 1029  Gross per 24 hour   Intake 1750 ml   Output 450 ml   Net 1300 ml       Physical Exam:  General appearance: Well developed, well nourished  Eyes:  Conjunctivae/corneas clear. PERRL.  Lungs: Normal respiratory effort,   clear to auscultation  bilaterally   Heart: Regular rate and rhythm, S1, S2 normal, no murmur, rub or major.  Abdomen: Soft, non-tender non-distended; bowel sounds normal; no masses,  no organomegaly  Extremities: No cyanosis or clubbing. 2+ chronic edema.    Skin: Skin color, texture, turgor normal. No rashes or lesions  Neurologic: Normal strength and tone. No focal numbness or weakness   Left knee CDI  Vazquez          Laboratory:  No results for input(s): WBC, RBC, HGB, HCT, PLT, MCV, MCH, MCHC in the last 24 hours.  BMP: No results for input(s): GLU, NA, K, CL, CO2, BUN, CREATININE, CALCIUM, MG in the last 168 hours.    Invalid input(s):  PHOS  Lab Results   Component Value Date    CALCIUM 9.6 08/17/2020     BNP  No results for input(s): BNP, BNPTRIAGEBLO in the last 168 hours.No results found for: URICACIDNo results found for: IRON, TIBC, FERRITIN, SATURATEDIRO  Lab Results   Component Value Date    CALCIUM 9.6 08/17/2020       Diagnostic Results:  X-Ray Knee 1 or 2 View Left    (Results Pending)       ASSESSMENT/PLAN:     1. S/P Left Knee (M17.11):  Per ortho/therapy teams.  2. HTN (I10):  meds with hold parameters.  3. DM (E11.65):  Oral meds when eating, ADA, SSI.   4. Yazidism (Z53.1):  May need oral iron if significant ABLA.  Will monitor.   5. CAD (I25.10):  Stress test noted.  Placed on Ranexa.  Cleared by Cards in [facility].  Place on tele.   6. DVT prophy:  Per ortho.        Thanks for consult  See above  Will follow along.

## 2020-08-27 NOTE — ANESTHESIA PROCEDURE NOTES
Spinal    Diagnosis: L KNEE  Patient location during procedure: holding area  Start time: 8/27/2020 8:41 AM  Timeout: 8/27/2020 8:41 AM  End time: 8/27/2020 8:47 AM    Staffing  Authorizing Provider: Carmelo Collins MD  Performing Provider: Carmelo Collins MD    Preanesthetic Checklist  Completed: patient identified, site marked, surgical consent, pre-op evaluation, timeout performed, IV checked, risks and benefits discussed and monitors and equipment checked  Spinal Block  Patient position: sitting  Prep: ChloraPrep  Patient monitoring: heart rate, cardiac monitor, continuous pulse ox and frequent blood pressure checks  Approach: right paramedian  Location: L3-4  Injection technique: single shot  CSF Fluid: clear free-flowing CSF  Needle  Needle type: pencil-tip   Needle gauge: 25 G  Needle length: 3.5 in  Additional Documentation: incremental injection, negative aspiration for heme and no paresthesia on injection  Needle localization: anatomical landmarks  Assessment  Sensory level: T5   Dermatomal levels determined by alcohol wipe and pinch or prick  Ease of block: easy  Patient's tolerance of the procedure: comfortable throughout block and no complaints

## 2020-08-27 NOTE — ANESTHESIA PROCEDURE NOTES
Peripheral Block    Patient location during procedure: holding area   Block not for primary anesthetic.  Reason for block: at surgeon's request and post-op pain management   Post-op Pain Location: L KNEE  Start time: 8/27/2020 8:09 AM  Timeout: 8/27/2020 8:09 AM   End time: 8/27/2020 8:15 AM    Staffing  Authorizing Provider: Carmelo Collins MD  Performing Provider: Carmelo Collins MD    Preanesthetic Checklist  Completed: patient identified, site marked, surgical consent, pre-op evaluation, timeout performed, IV checked, risks and benefits discussed and monitors and equipment checked  Peripheral Block  Patient position: supine  Prep: ChloraPrep and site prepped and draped  Patient monitoring: heart rate, cardiac monitor, continuous pulse ox and frequent blood pressure checks  Block type: adductor canal  Laterality: left  Injection technique: single shot  Needle  Needle type: Stimuplex   Needle gauge: 22 G  Needle length: 3.5 in  Needle localization: anatomical landmarks and ultrasound guidance   -ultrasound image captured on disc.  Assessment  Injection assessment: negative aspiration, negative parasthesia and local visualized surrounding nerve  Paresthesia pain: none  Heart rate change: no  Slow fractionated injection: yes

## 2020-08-27 NOTE — PT/OT/SLP PROGRESS
Physical Therapy      Patient Name:  Haylie Jameson   MRN:  [id number]    Patient not seen today secondary to Pain(pt c/o 8/10 pain at surgical knee and unable to particpate at this time.). Will follow-up 08/27/2020.    Kofi Grace, PT

## 2020-08-27 NOTE — NURSING
Pt arrived to floor via stretcher with SUSIE Ghotra and transferred to bed. IVF started, SCDs applied, oriented to room, call light placed within reach, bed low and locked, and family at bedside. Pt complains of pain 5/10.  Vazquez noted draining clear yellow urine to gravity. Incisions noted to left knee, CDI with polar care. No acute distress noted at this time. Will continue to monitor.

## 2020-08-27 NOTE — PLAN OF CARE
Initial Discharge Planning Assessment:     Patient admitted on 8/27//20  PCP updated in Epic: None   Pharmacy, updated in Epic: [facility] in [place]   DME at home:   Current dispo: Pt lives at home with spouse  Transportation: Family to drive   Power of  or Living Will: none  Hospital Readmission: none     CM ordered patient a rolling walker and commode  CM discussed with patient home health options and freedom of choice. Patient requested MD preferred provider [facility]       Case management  to follow.              08/27/20 1327   Discharge Assessment   Assessment Type Discharge Planning Assessment   Confirmed/corrected address and phone number on facesheet? Yes   Assessment information obtained from? Patient   Prior to hospitilization cognitive status: Alert/Oriented   Prior to hospitalization functional status: Independent   Current cognitive status: Alert/Oriented   Current Functional Status: Assistive Equipment   Lives With spouse   Able to Return to Prior Arrangements no   Is patient able to care for self after discharge? No   Patient's perception of discharge disposition home or selfcare   Readmission Within the Last 30 Days no previous admission in last 30 days   Patient currently being followed by outpatient case management? No   Patient currently receives any other outside agency services? No   Equipment Currently Used at Home none   Do you have any problems affording any of your prescribed medications? No   Is the patient taking medications as prescribed? yes   Does the patient have transportation home? Yes   Transportation Anticipated family or friend will provide   Does the patient receive services at the Coumadin Clinic? No   Discharge Plan A Home Health   DME Needed Upon Discharge  3-in-1 commode;walker, rolling   Patient/Family in Agreement with Plan yes

## 2020-08-27 NOTE — PT/OT/SLP PROGRESS
Physical Therapy      Patient Name:  Haylie Jameson   MRN:  [id number]    Patient not seen today secondary to Pain(pt now reporting 9/10 pain after recieving pain meds approximately 30 min ago.  Nurse notified.). Will follow-up 08/27/2020.    Kofi Grace, PT

## 2020-08-27 NOTE — PLAN OF CARE
Problem: Physical Therapy Goal  Goal: Physical Therapy Goal  Description: Goals to be met by: 2020    Patient will increase functional independence with mobility by performin. Sit<>stand with SBA with RW.  2. Gait x 150 feet with RW with SBA.  3. Ascend/descend 1 curb step(s) with least restrictive assistive device and CGA.      Outcome: Ongoing, Progressing   Pt presented supine in  bed, agreeable to PT.  Attempted therex pt c/o 9/10 pain, therex deferred. Bed mobility supine >sit EOB with min A AT L LE. Pt c/o nausea up sitting, sat EOB x 10 min for administering of nausea medication. Pt report feeling better. Sit>stand with mod A to RW, instruction for hand placement and wt shift. Gait training x 50 ft with RW and CGA; Gait deviations of decreased stride length with step to gait pattern, decreased surgical knee flexion, and decreased surgical heel strike/toe off. Increased double limb support time. With verbal cues noted improvements in step through gait.  Gait terminated due to c/o dizziness.  Pt transferred to sit in BSChair with CGA. Positioned EOB.

## 2020-08-27 NOTE — OP NOTE
DATE OF PROCEDURE: 08/27/2020     CHIEF COMPLAINT AND PRESENT ILLNESS:   65-year-old with progressive pain deformity left knee intolerable at this point consequently elected for left knee replacement significant risk discussed     PREOPERATIVE DIAGNOSIS:  Degenerative joint disease left knee     POSTOPERATIVE DIAGNOSIS:   same     PROCEDURES PERFORMED:   left knee replacement Biomet jadon 62.5/71/10/34    SURGEON:  Bassam Pardo M.D.     ASSISTANT:  Mikaela Elizabeth CST.     COMPLICATIONS:   none     ANESTHESIA:  Spinal     BLOOD LOSS:   76     IMPLANTS:  As above     PROCEDURE IN DETAIL:   patient brought the operating room and spinal anesthesia without difficulty left lower extremities prepped in usual fashion tourniquet applied 3 mm mercury after Esmarch.  Using a fairly liberal a midline incision because of the size of her knee sharp dissection made down extensor mechanism.  Her pre patella fat was about 3 in.  Sharp dissection made down extensor mechanism standard medial parapatellar arthrotomy was performed medial fat pad was removed and medial release performed on to gain access to proximal tibia.  Anterior posterior cruciate ligaments removed mediolateral and medial lateral meniscal remnants removed.  A tibial cut was made per midshaft tibia sizing was 71.  Attention turned to the femur with intramedullary guide a 3 degree provisional distal cut was performed.  Sizing was 62.5 so with the 62.5 tensor in flexion anterior-posterior cuts performed flexion gap was symmetric at 10.  Attention turned extension gap again with the tensor 10 mm distal cut was performed flexion-extension gaps were symmetric at 10 good alignment good stability.  Chamber cuts performed notch cut was performed with a floating trial 0-110 degrees mainly limited because of the size of her thigh.  Rotation was marked tibia was punched Prepatellar thickness was 20 post patellar thickness was 23 with a 34 patella did want to get below 15  mm.  The appropriate components opened using good cement technique all components were cemented excess cement was removed pulse lavage irrigation was used.  Antibiotic cement was used because of her diabetes.  Final 10 mm insert placed.  Again excellent range of motion stability.  1.  Vicryl using parapatellar arthrotomy closed in flexion post closure 0-110 degrees range of motion to 0 Vicryl subcutaneously staples on the skin Exparel tranexamic acid were instilled the soft tissues placed in bulky sterile dressing tourniquet released 76 min tolerated procedure well brought to come sterile fashion    Was performed with a poor recognition system

## 2020-08-27 NOTE — ANESTHESIA POSTPROCEDURE EVALUATION
Anesthesia Post Evaluation    Patient: Haylie Jameson    Procedure(s) Performed: Procedure(s) (LRB):  ARTHROPLASTY, KNEE, TOTAL (Left)    Final Anesthesia Type: general    Patient location during evaluation: PACU  Patient participation: Yes- Able to Participate  Level of consciousness: awake and alert and oriented  Post-procedure vital signs: reviewed and stable  Pain management: adequate  Airway patency: patent    PONV status at discharge: No PONV  Anesthetic complications: no      Cardiovascular status: blood pressure returned to baseline and hemodynamically stable  Respiratory status: unassisted, spontaneous ventilation and room air  Hydration status: euvolemic  Follow-up not needed.          Vitals Value Taken Time   /69 08/27/20 1126   Temp 36.3 °C (97.3 °F) 08/27/20 1038   Pulse 64 08/27/20 1147   Resp 16 08/27/20 1125   SpO2 97 % 08/27/20 1147   Vitals shown include unvalidated device data.      No case tracking events are documented in the log.      Pain/Sundeep Score: Pain Rating Prior to Med Admin: 0 (8/27/2020  7:30 AM)  Sundeep Score: 10 (8/27/2020 11:15 AM)

## 2020-08-27 NOTE — PLAN OF CARE
[facility] can't staff the patient's area  [place], ortho ok'd referral being sent to [facility] and patient is agreeable to [place]   Referral sent to [facility] [phone]  Awaiting approval for home health and equipment

## 2020-08-27 NOTE — TRANSFER OF CARE
"Anesthesia Transfer of Care Note    Patient: Haylie Jameson    Procedure(s) Performed: Procedure(s) (LRB):  ARTHROPLASTY, KNEE, TOTAL (Left)    Patient location: PACU    Anesthesia Type: general    Transport from OR: Transported from OR on 2-3 L/min O2 by NC with adequate spontaneous ventilation    Post pain: adequate analgesia    Post assessment: no apparent anesthetic complications    Post vital signs: stable    Level of consciousness: awake    Nausea/Vomiting: no nausea/vomiting    Complications: none    Transfer of care protocol was followed      Last vitals:   Visit Vitals  BP (!) 174/72 (BP Location: Right arm, Patient Position: Sitting)   Pulse 68   Temp 36.5 °C (97.7 °F) (Skin)   Resp 16   Ht 5' 6" (1.676 m)   Wt 96.2 kg (212 lb)   SpO2 99%   Breastfeeding No   BMI 34.22 kg/m²     "

## 2020-08-27 NOTE — PT/OT/SLP EVAL
Physical Therapy Evaluation    Patient Name:  Haylie Jameson   MRN:  [id number]    Recommendations:     Discharge Recommendations:  home health PT   Discharge Equipment Recommendations: walker, rolling, bedside commode   Barriers to discharge: None    Assessment:     Haylie Jameson is a 65 y.o. female admitted with a medical diagnosis of <principal problem not specified>.  She presents with the following impairments/functional limitations:  weakness, impaired endurance, impaired self care skills, impaired functional mobilty, gait instability, impaired balance, decreased lower extremity function, pain, decreased ROM, impaired joint extensibility . Pt  with functional mobility deficits and should benefit from  PT  to maximize I and safety decrease risk of further decline of injury.    Rehab Prognosis: Good; patient would benefit from acute skilled PT services to address these deficits and reach maximum level of function.    Recent Surgery: Procedure(s) (LRB):  ARTHROPLASTY, KNEE, TOTAL (Left) Day of Surgery    Plan:     During this hospitalization, patient to be seen BID to address the identified rehab impairments via gait training, therapeutic activities, therapeutic exercises and progress toward the following goals:    · Plan of Care Expires:  09/27/20    Subjective     Chief Complaint: L knee pain, nausea, dizziness  Patient/Family Comments/goals: to d/c in am  Pain/Comfort:  · Pain Rating 1: 9/10  · Location - Side 1: Left  · Location - Orientation 1: generalized  · Location 1: knee  · Pain Addressed 1: Pre-medicate for activity, Reposition, Distraction, Cessation of Activity, Nurse notified  · Pain Rating Post-Intervention 1: 7/10    Patients cultural, spiritual, Spiritism conflicts given the current situation: no    Living Environment:  Pt lives with  in a [facility], one TH step to enter  Prior to admission, patients level of function was I.  Equipment used at home: none.  DME owned (not currently  used): none.  Upon discharge, patient will have assistance from .    Objective:     Communicated with nurse Varinder prior to session.  Patient found supine with peripheral IV, sandoval catheter, FCD, SCD  upon PT entry to room.    General Precautions: Standard, fall   Orthopedic Precautions:LLE weight bearing as tolerated   Braces: N/A     Exams:  Cognition:   Patient is oriented to name, , date, place, situation.  Pt follows approximately 100% of one step commands.    Mood: Pleasant and cooperative.   Musculoskeletal:  Posture: Protective guarding surgical joint  LE ROM/Strength: 5/5 bilateral hip flexion, nonsurgical knee extension, bilateral ankle dorsiflexion. AROM surgical knee difficult to accurately assess with large bulky dressing, although knee flexion grossly -5>85 degrees. Surgical knee strength NT dues to c/o 9/10 pain, WFL  Neuromuscular:  Sensation: Intact to light touch bilateral LEs. Pt denied paresthesias.   Coordination/Tone/Reflexes: No impairments identified with functional mobility. No formal testing performed.   Balance: CGA for dynamic standing with bilateral UE support.   Visual-vestibular: No impairments identified with functional mobility. No formal testing performed.  Integument:  Visible skin intact and surgical extremity dressing clean and dry.   ·     Functional Mobility:  · Bed Mobility:     · Supine to Sit: minimum assistance  · Transfers:     · Sit to Stand:  moderate assistance with rolling walker  · Bed to Chair: contact guard assistance with  rolling walker  using  Step Transfer  · Gait: x 50 ft with RW and CGA; Gait deviations of decreased stride length with step to gait pattern, decreased surgical knee flexion, and decreased surgical heel strike/toe off. Increased double limb support time. With verbal cues noted improvements in step through gait.      Therapeutic Activities and Exercises:    Pt presented supine in  bed, agreeable to PT.  Attempted therex pt c/o 9/10  pain, therex deferred. Bed mobility supine >sit EOB with min A AT L LE. Pt c/o nausea up sitting, sat EOB x 10 min for administering of nausea medication. Pt report feeling better. Sit>stand with mod A to RW, instruction for hand placement and wt shift. Gait training x 50 ft with RW and CGA; Gait deviations of decreased stride length with step to gait pattern, decreased surgical knee flexion, and decreased surgical heel strike/toe off. Increased double limb support time. With verbal cues noted improvements in step through gait.  Gait terminated due to c/o dizziness.  Pt transferred to sit in BSChair with CGA. Positioned EOB.     AM-PAC 6 CLICK MOBILITY  Total Score:16     Patient left supine with all lines intact, call button in reach, nurse notified and  present.    GOALS:   Multidisciplinary Problems     Physical Therapy Goals        Problem: Physical Therapy Goal    Goal Priority Disciplines Outcome Goal Variances Interventions   Physical Therapy Goal     PT, PT/OT Ongoing, Progressing     Description: Goals to be met by: 2020    Patient will increase functional independence with mobility by performin. Sit<>stand with SBA with RW.  2. Gait x 150 feet with RW with SBA.  3. Ascend/descend 1 curb step(s) with least restrictive assistive device and CGA.                       History:     Past Medical History:   Diagnosis Date    Cancer 2018    lung cancer    Diabetes mellitus     Hypertension     Refusal of blood transfusions as patient is Confucianism        Past Surgical History:   Procedure Laterality Date    APPENDECTOMY       SECTION      x 3    GALLBLADDER SURGERY      LUNG CANCER SURGERY Right     partial lobectomy    TUBAL LIGATION         Time Tracking:     PT Received On: 20  PT Start Time: 1420     PT Stop Time: 1455  PT Total Time (min): 35 min     Billable Minutes: Evaluation 15 and Gait Training 10      Kofi Grace, PT  2020

## 2020-08-27 NOTE — PT/OT/SLP PROGRESS
Physical Therapy Treatment    Patient Name:  Haylie Jameson   MRN:  [id number]    Recommendations:     Discharge Recommendations:  home health PT   Discharge Equipment Recommendations: walker, rolling, bedside commode   Barriers to discharge: None (pt will have assistance from )    Assessment:     Haylie Jameson is a 65 y.o. female admitted with a medical diagnosis of <principal problem not specified>.  She presents with the following impairments/functional limitations:  weakness, impaired endurance, impaired self care skills, impaired functional mobilty, gait instability, impaired balance, decreased lower extremity function, pain, decreased ROM, impaired skin, edema, orthopedic precautions ;pt with improved mobility this afternoon, inc amb distance, no c/o's nausea.    Rehab Prognosis: Good; patient would benefit from acute skilled PT services to address these deficits and reach maximum level of function.    Recent Surgery: Procedure(s) (LRB):  ARTHROPLASTY, KNEE, TOTAL (Left) Day of Surgery    Plan:     During this hospitalization, patient to be seen BID to address the identified rehab impairments via gait training, therapeutic activities, therapeutic exercises and progress toward the following goals:    · Plan of Care Expires:  09/27/20    Subjective     Chief Complaint: pain  Patient/Family Comments/goals: pt agreeable to session, reports feeling better, no nausea at this time.  Pain/Comfort:  · Pain Rating 1: 5/10(at start of session)  · Location - Side 1: Left  · Location - Orientation 1: generalized  · Location 1: knee  · Pain Addressed 1: Pre-medicate for activity, Reposition, Distraction  · Pain Rating Post-Intervention 1: 7/10(at end of session)      Objective:     Communicated with nurse prior to session.  Patient found up in chair with sandoval catheter, FCD, peripheral IV, cryotherapy, SCD upon PT entry to room.     General Precautions: Standard, fall, diabetic   Orthopedic Precautions:LLE  weight bearing as tolerated   Braces: N/A     Functional Mobility:  · Bed Mobility:     · Sit to Supine: moderate assistance and at LE's  · Transfers:     · Sit to Stand:  contact guard assistance with rolling walker  · Gait: amb'd ~100' with RW and CGA, cueing to inc knee flexion      AM-PAC 6 CLICK MOBILITY  Turning over in bed (including adjusting bedclothes, sheets and blankets)?: 3  Sitting down on and standing up from a chair with arms (e.g., wheelchair, bedside commode, etc.): 3  Moving from lying on back to sitting on the side of the bed?: 3  Moving to and from a bed to a chair (including a wheelchair)?: 3  Need to walk in hospital room?: 3  Climbing 3-5 steps with a railing?: 2  Basic Mobility Total Score: 17       Therapeutic Activities and Exercises:   perf'd seated heelslides x 5 ea.; supine AP's, QS, heelslides x 5 ea.     Patient left HOB elevated with all lines intact, call button in reach, bed alarm on, nurse notified,  present and cryotherapy to knee, SCD/FCD's on...    GOALS:   Multidisciplinary Problems     Physical Therapy Goals        Problem: Physical Therapy Goal    Goal Priority Disciplines Outcome Goal Variances Interventions   Physical Therapy Goal     PT, PT/OT Ongoing, Progressing     Description: Goals to be met by: 2020    Patient will increase functional independence with mobility by performin. Sit<>stand with SBA with RW.  2. Gait x 150 feet with RW with SBA.  3. Ascend/descend 1 curb step(s) with least restrictive assistive device and CGA.                       Time Tracking:     PT Received On: 20  PT Start Time:      PT Stop Time: 1642  PT Total Time (min): 26 min     Billable Minutes: Gait Training 16 and Therapeutic Exercise 10       PT/PTA: PTA     PTA Visit Number: 0     Skye Ybarra PTA  2020

## 2020-08-27 NOTE — PLAN OF CARE
Problem: Physical Therapy Goal  Goal: Physical Therapy Goal  Description: Goals to be met by: 2020    Patient will increase functional independence with mobility by performin. Sit<>stand with SBA with RW.  2. Gait x 150 feet with RW with SBA.  3. Ascend/descend 1 curb step(s) with least restrictive assistive device and CGA.      Outcome: Ongoing, Progressing   Pt sit to stand CGA, amb'd 100' with RW and CGA, WBAT on LLE. 5-7/10 pain during second session, less nausea. HHPT

## 2020-08-28 VITALS
TEMPERATURE: 98 F | OXYGEN SATURATION: 96 % | RESPIRATION RATE: 18 BRPM | HEIGHT: 66 IN | DIASTOLIC BLOOD PRESSURE: 76 MMHG | SYSTOLIC BLOOD PRESSURE: 173 MMHG | BODY MASS INDEX: 34.07 KG/M2 | HEART RATE: 77 BPM | WEIGHT: 212 LBS

## 2020-08-28 PROBLEM — M17.12 PRIMARY OSTEOARTHRITIS OF LEFT KNEE: Status: ACTIVE | Noted: 2020-08-28

## 2020-08-28 PROBLEM — M17.12 PRIMARY OSTEOARTHRITIS OF LEFT KNEE: Status: RESOLVED | Noted: 2020-08-28 | Resolved: 2020-08-28

## 2020-08-28 LAB
ANION GAP SERPL CALC-SCNC: 13 MMOL/L (ref 8–16)
BASOPHILS # BLD AUTO: 0.02 K/UL (ref 0–0.2)
BASOPHILS NFR BLD: 0.1 % (ref 0–1.9)
BUN SERPL-MCNC: 9 MG/DL (ref 8–23)
CALCIUM SERPL-MCNC: 8.4 MG/DL (ref 8.7–10.5)
CHLORIDE SERPL-SCNC: 101 MMOL/L (ref 95–110)
CO2 SERPL-SCNC: 21 MMOL/L (ref 23–29)
CREAT SERPL-MCNC: 0.8 MG/DL (ref 0.5–1.4)
DIFFERENTIAL METHOD: ABNORMAL
EOSINOPHIL # BLD AUTO: 0 K/UL (ref 0–0.5)
EOSINOPHIL NFR BLD: 0.1 % (ref 0–8)
ERYTHROCYTE [DISTWIDTH] IN BLOOD BY AUTOMATED COUNT: 13.2 % (ref 11.5–14.5)
EST. GFR  (AFRICAN AMERICAN): >60 ML/MIN/1.73 M^2
EST. GFR  (NON AFRICAN AMERICAN): >60 ML/MIN/1.73 M^2
GLUCOSE SERPL-MCNC: 177 MG/DL (ref 70–110)
HCT VFR BLD AUTO: 36.3 % (ref 37–48.5)
HGB BLD-MCNC: 11.8 G/DL (ref 12–16)
IMM GRANULOCYTES # BLD AUTO: 0.09 K/UL (ref 0–0.04)
IMM GRANULOCYTES NFR BLD AUTO: 0.7 % (ref 0–0.5)
LYMPHOCYTES # BLD AUTO: 0.9 K/UL (ref 1–4.8)
LYMPHOCYTES NFR BLD: 6.6 % (ref 18–48)
MCH RBC QN AUTO: 28 PG (ref 27–31)
MCHC RBC AUTO-ENTMCNC: 32.5 G/DL (ref 32–36)
MCV RBC AUTO: 86 FL (ref 82–98)
MONOCYTES # BLD AUTO: 1.3 K/UL (ref 0.3–1)
MONOCYTES NFR BLD: 9.4 % (ref 4–15)
NEUTROPHILS # BLD AUTO: 11.3 K/UL (ref 1.8–7.7)
NEUTROPHILS NFR BLD: 83.1 % (ref 38–73)
NRBC BLD-RTO: 0 /100 WBC
PLATELET # BLD AUTO: 310 K/UL (ref 150–350)
PMV BLD AUTO: 9.7 FL (ref 9.2–12.9)
POCT GLUCOSE: 221 MG/DL (ref 70–110)
POCT GLUCOSE: 226 MG/DL (ref 70–110)
POCT GLUCOSE: 255 MG/DL (ref 70–110)
POTASSIUM SERPL-SCNC: 4.4 MMOL/L (ref 3.5–5.1)
RBC # BLD AUTO: 4.21 M/UL (ref 4–5.4)
SODIUM SERPL-SCNC: 135 MMOL/L (ref 136–145)
WBC # BLD AUTO: 13.54 K/UL (ref 3.9–12.7)

## 2020-08-28 PROCEDURE — 25000003 PHARM REV CODE 250: Performed by: ORTHOPAEDIC SURGERY

## 2020-08-28 PROCEDURE — 85025 COMPLETE CBC W/AUTO DIFF WBC: CPT

## 2020-08-28 PROCEDURE — 97535 SELF CARE MNGMENT TRAINING: CPT

## 2020-08-28 PROCEDURE — 63600175 PHARM REV CODE 636 W HCPCS: Performed by: ORTHOPAEDIC SURGERY

## 2020-08-28 PROCEDURE — 80048 BASIC METABOLIC PNL TOTAL CA: CPT

## 2020-08-28 PROCEDURE — 97110 THERAPEUTIC EXERCISES: CPT | Mod: CQ

## 2020-08-28 PROCEDURE — 25000003 PHARM REV CODE 250: Performed by: NURSE PRACTITIONER

## 2020-08-28 PROCEDURE — 63600175 PHARM REV CODE 636 W HCPCS: Performed by: NURSE PRACTITIONER

## 2020-08-28 PROCEDURE — 97165 OT EVAL LOW COMPLEX 30 MIN: CPT

## 2020-08-28 PROCEDURE — 97116 GAIT TRAINING THERAPY: CPT | Mod: CQ

## 2020-08-28 PROCEDURE — 36415 COLL VENOUS BLD VENIPUNCTURE: CPT

## 2020-08-28 RX ORDER — HYDROCODONE BITARTRATE AND ACETAMINOPHEN 10; 325 MG/1; MG/1
1 TABLET ORAL EVERY 6 HOURS PRN
Qty: 50 TABLET | Refills: 0 | Status: ON HOLD | OUTPATIENT
Start: 2020-08-28 | End: 2020-12-03

## 2020-08-28 RX ORDER — NAPROXEN SODIUM 220 MG/1
81 TABLET, FILM COATED ORAL 2 TIMES DAILY
Refills: 0
Start: 2020-08-28 | End: 2021-08-28

## 2020-08-28 RX ORDER — ONDANSETRON 4 MG/1
4 TABLET, FILM COATED ORAL 2 TIMES DAILY
Qty: 20 TABLET | Refills: 0 | Status: SHIPPED | OUTPATIENT
Start: 2020-08-28 | End: 2020-11-30 | Stop reason: CLARIF

## 2020-08-28 RX ADMIN — HYDROCODONE BITARTRATE AND ACETAMINOPHEN 1 TABLET: 10; 325 TABLET ORAL at 03:08

## 2020-08-28 RX ADMIN — INSULIN ASPART 1 UNITS: 100 INJECTION, SOLUTION INTRAVENOUS; SUBCUTANEOUS at 12:08

## 2020-08-28 RX ADMIN — HYDROCODONE BITARTRATE AND ACETAMINOPHEN 1 TABLET: 10; 325 TABLET ORAL at 12:08

## 2020-08-28 RX ADMIN — GLIMEPIRIDE 2 MG: 2 TABLET ORAL at 10:08

## 2020-08-28 RX ADMIN — FAMOTIDINE 20 MG: 20 TABLET, FILM COATED ORAL at 10:08

## 2020-08-28 RX ADMIN — DILTIAZEM HYDROCHLORIDE 180 MG: 180 CAPSULE, COATED, EXTENDED RELEASE ORAL at 10:08

## 2020-08-28 RX ADMIN — HYDROCODONE BITARTRATE AND ACETAMINOPHEN 1 TABLET: 10; 325 TABLET ORAL at 06:08

## 2020-08-28 RX ADMIN — MORPHINE SULFATE 4 MG: 4 INJECTION, SOLUTION INTRAMUSCULAR; INTRAVENOUS at 12:08

## 2020-08-28 RX ADMIN — METFORMIN HYDROCHLORIDE 500 MG: 500 TABLET ORAL at 10:08

## 2020-08-28 RX ADMIN — POLYETHYLENE GLYCOL 3350 17 G: 17 POWDER, FOR SOLUTION ORAL at 10:08

## 2020-08-28 RX ADMIN — RANOLAZINE 500 MG: 500 TABLET, FILM COATED, EXTENDED RELEASE ORAL at 10:08

## 2020-08-28 RX ADMIN — NEBIVOLOL HYDROCHLORIDE 20 MG: 10 TABLET ORAL at 10:08

## 2020-08-28 RX ADMIN — CEFAZOLIN SODIUM 2 G: 2 SOLUTION INTRAVENOUS at 12:08

## 2020-08-28 RX ADMIN — MUPIROCIN 1 G: 20 OINTMENT TOPICAL at 10:08

## 2020-08-28 RX ADMIN — ASPIRIN 81 MG CHEWABLE TABLET 81 MG: 81 TABLET CHEWABLE at 10:08

## 2020-08-28 RX ADMIN — INSULIN ASPART 2 UNITS: 100 INJECTION, SOLUTION INTRAVENOUS; SUBCUTANEOUS at 08:08

## 2020-08-28 RX ADMIN — ONDANSETRON 8 MG: 8 TABLET, ORALLY DISINTEGRATING ORAL at 12:08

## 2020-08-28 RX ADMIN — LISINOPRIL 5 MG: 5 TABLET ORAL at 10:08

## 2020-08-28 NOTE — PLAN OF CARE
Problem: Occupational Therapy Goal  Goal: Occupational Therapy Goal  Description: Goals to be met by: 8/30/20    Patient will increase functional independence with ADLs by performing:    Pt and caregiver verbalize pt's left TKA precautions and how to adhere to TKA precautions during ADLs/ADL mobility. MET  Don underwear at Min A level adhering to TKA precautions.  Don socks at Min A level adhering to TKA precautions.  Don shoes at Min A level adhering to TKA precautions.  Toilet transfers at Min A level adhering to TKA precautions.  Toileting at Min A level adhering to TKA precautions.        Outcome: Ongoing, Progressing   Evaluation complete and goals established.  Pt's  present for caregiver training.  Pt is needing significant assistance with sit to stand from all surfaces.  Recommend pt have 2 person assist for sit to stand at home and caregiver uses a gait belt.  Pt and caregiver verbalized understanding.   Pt is scheduled to discharge to home with assist of .  Recommend Home Health OT and PT.

## 2020-08-28 NOTE — PLAN OF CARE
Patient will discharge home    Accepted by [facility]     DME delivered to  bedside by BARRIE murillo    Family will transport home           08/28/20 1143   Final Note   Assessment Type Final Discharge Note   Anticipated Discharge Disposition Home-Health   What phone number can be called within the next 1-3 days to see how you are doing after discharge? [phone]   Hospital Follow Up  Appt(s) scheduled? No   Discharge plans and expectations educations in teach back method with documentation complete? Yes

## 2020-08-28 NOTE — DISCHARGE SUMMARY
[facility]  Discharge Summary      Admit Date: 8/27/2020    Discharge Date and Time: No discharge date for patient encounter.    Attending Physician: Bassam Pardo MD     Reason for Admission:  Osteoarthritis left knee    Procedures Performed: Procedure(s) (LRB):  ARTHROPLASTY, KNEE, TOTAL (Left)    Hospital Course (synopsis of major diagnoses, care, treatment, and services provided during the course of the hospital stay): The above patient underwent the above procedure.  Post operative the patient received pain management and pt / ot. The patient progressed well and will be discharged--see orders.     Consults: nephrology    Significant Diagnostic Studies: Labs: All labs within the past 24 hours have been reviewed    Final Diagnoses:    Principal Problem: Primary osteoarthritis of left knee   Secondary Diagnoses:   Active Hospital Problems    Diagnosis  POA    Preop testing [Z01.818]  Not Applicable      Resolved Hospital Problems    Diagnosis Date Resolved POA    *Primary osteoarthritis of left knee [M17.12] 08/28/2020 Yes       Discharged Condition: good    Disposition: [place]-Health Care [facility]    Follow Up/Patient Instructions:     Medications:  Reconciled Home Medications:      Medication List      START taking these medications    aspirin 81 MG Chew  Take 1 tablet (81 mg total) by mouth 2 (two) times daily.        CHANGE how you take these medications    * HYDROcodone-acetaminophen 7.5-325 mg per tablet  Commonly known as: NORCO  Take 1 tablet by mouth every 6 (six) hours as needed for Pain.  What changed: Another medication with the same name was added. Make sure you understand how and when to take each.     * HYDROcodone-acetaminophen  mg per tablet  Commonly known as: NORCO  Take 1 tablet by mouth every 6 (six) hours as needed.  What changed: You were already taking a medication with the same name, and this prescription was added. Make sure you understand how and when to take  "each.         * This list has 2 medication(s) that are the same as other medications prescribed for you. Read the directions carefully, and ask your doctor or other care provider to review them with you.            CONTINUE taking these medications    diltiaZEM 180 MG Cs24  Commonly known as: TIAZAC  Take 180 mg by mouth once daily.     glimepiride 2 MG tablet  Commonly known as: AMARYL  Take 2 mg by mouth 2 (two) times a day.     lisinopriL 5 MG tablet  Commonly known as: PRINIVIL,ZESTRIL  Take 5 mg by mouth once daily.     metFORMIN 500 mg 24hr tablet  Commonly known as: FORTAMET  Take 500 mg by mouth 2 (two) times daily with meals.     nebivoloL 10 MG Tab  Commonly known as: BYSTOLIC  Take 20 mg by mouth once daily.     promethazine 25 MG tablet  Commonly known as: PHENERGAN  Take 25 mg by mouth as needed for Nausea.     ranolazine 500 MG Tb12  Commonly known as: RANEXA  Take 500 mg by mouth 2 (two) times daily.     simvastatin 40 MG tablet  Commonly known as: ZOCOR  Take 40 mg by mouth every evening.          Discharge Procedure Orders   WALKER FOR HOME USE     Order Specific Question Answer Comments   Type of Walker: Adult (5'4"-6'6")    With wheels? Yes    Height: 5' 6" (1.676 m)    Weight: 96.2 kg (212 lb)    Length of need (1-99 months): 99    Please check all that apply: Patient's condition impairs ambulation.    Please check all that apply: Walker will be used for gait training.    Please check all that apply: Patient is unable to safely ambulate without equipment.      COMMODE FOR HOME USE     Order Specific Question Answer Comments   Type: Standard    Height: 5' 6" (1.676 m)    Weight: 96.2 kg (212 lb)    Length of need (1-99 months): 99      COVID-19 Routine Screening   Standing Status: Future Number of Occurrences: 1 Standing Exp. Date: 10/11/21     Order Specific Question Answer Comments   Is the patient symptomatic? No    Is this needed for pre-procedure or pre-op testing? Yes    Diagnosis: Preop " testing [982793]      Follow-up Information     [facility].    Specialties: DME Provider, Home Health Services  Contact information:  [address] [facility] [id number] [phone]             [facility]    Specialty: DME Provider  Why: for questions regarding medical equipment  Contact information:  [address] [facility] [id number] [phone]             Please follow up.    Why: AS SCHEDULED , Call [phone] to reach Dr. Pardo

## 2020-08-28 NOTE — PLAN OF CARE
Problem: Physical Therapy Goal  Goal: Physical Therapy Goal  Description: Goals to be met by: 2020    Patient will increase functional independence with mobility by performin. Sit<>stand with SBA with RW.  2. Gait x 150 feet with RW with SBA.  3. Ascend/descend 1 curb step(s) with least restrictive assistive device and CGA.      Outcome: Ongoing, Progressing   Pt presented supine HOB elevated. Pt agreeable to PT. Supine<>sit ModA x2. Sit<>stand ModA x2. Pt amb'd 140ft CGA w/ RW.

## 2020-08-28 NOTE — PT/OT/SLP EVAL
Occupational Therapy   Evaluation and Treatment    Name: Haylie Jameson  MRN: [id number]  Admitting Diagnosis:  Primary osteoarthritis of left knee 1 Day Post-Op    Recommendations:     Discharge Recommendations: home health PT, home health OT(Assist from family.)  Discharge Equipment Recommendations:  3-in-1 commode, walker, rolling  Barriers to discharge:  Inaccessible home environment, Decreased caregiver support(Pt is needing 2 person assist for sit to stand.)    Assessment:     Haylie Jameson is a 65 y.o. female with a medical diagnosis of Primary osteoarthritis of left knee.  She presents agreeable to OT evaluation and tx session. Performance deficits affecting function: impaired endurance, impaired self care skills, impaired balance, gait instability, impaired functional mobilty, weakness, decreased safety awareness, impaired cardiopulmonary response to activity, impaired joint extensibility, decreased ROM, decreased lower extremity function, orthopedic precautions, edema, impaired skin, pain.   Pt is needing significant assist with sit to stand at this time.  Recommended pt's  use a gait belt to assist pt with sit to stand and have a second person to assist him when pt discharges to home for safety.  Recommend discharge to home with 24 hr assist and Home Health OT and PT.     Rehab Prognosis: Good; patient would benefit from acute skilled OT services to address these deficits and reach maximum level of function.       Plan:     Patient to be seen daily to address the above listed problems via self-care/home management, therapeutic activities, therapeutic exercises  · Plan of Care Expires: 09/27/20  · Plan of Care Reviewed with:  patient and     Subjective     Chief Complaint: Left knee pain   Patient/Family Comments/goals: Return home with assist of her     Occupational Profile:  Living Environment: Lives with  in one story home with threshold step to enter,  "tub/shower  Previous level of function: Mod I<>Min A due to left knee pain  Equipment Used at Home:  none  Assistance upon Discharge:  is off of work for 12 days, he reports.  Pt's  stating he has family that can assist if needed.    Pain/Comfort:  · Pain Rating 1: (Pt stating her left knee is hurting "a lot".  pt did not rate pain but did ask for pain medication.  Pt's nurse reporting that pt was not able to receive pain medication until 1100.  Pt still agreeable to participating in OT evaluation and tx session.)  · Location - Side 1: Left  · Location - Orientation 1: generalized  · Location 1: knee  · Pain Addressed 1: Pre-medicate for activity, Reposition, Distraction, Nurse notified  · Pain Rating Post-Intervention 1: (Pt not rating pain and still saying her pain is "a lot".)    Patients cultural, spiritual, Sabianism conflicts given the current situation: Jehovah Witness per chart.    Objective:     Communicated with: nurses, Yvrose and Varinder, prior to session.  Patient found up in chair with cryotherapy upon OT entry to room.    General Precautions: Standard, fall, diabetic   Orthopedic Precautions:LLE weight bearing as tolerated   Braces: N/A     Occupational Performance:    Bed Mobility:    · Patient completed Supine to Sit with minimum assistance  · Patient completed Sit to Supine with minimum assistance    Functional Mobility/Transfers:  · Patient completed Sit <> Stand Transition: Max A x 2 with RW from chair (1st OT session)  · Patient complete Sit<>Stand Transition: Max A x 2 with RW from bed (2nd OT session) and  Max A x 1 with RW from BSC that was elevated (2nd OT session)   · Once standing, pt is able to ambulate and transfer with CGA using RW and verbal cues for safety and adhering to left TKA precautions.  · Functional Mobility:  Pt needing significant assist with sit to stand from all surfaces.  Pt and  educated on pt having 2 person assist at home for sit to stand for " safety of caregiver and pt until pt is able to perform with less assistance.  Pt and  educated on pt sitting in a straight back chair with arm rests at home with a pillow in the seat until she increases her Indep with sit to stand.  OT highly recommended that pt not sit in a recliner at this time, especially due to pt stating the recliner swivels and rocks.  Pt's  demonstrating ability to cue pt to adhere to left TKA precautions, especially during turns and staying close/inside RW.    Activities of Daily Living:  · Feeding:  independence    · Grooming: Set up in sitting    · Upper Body Dressing: Set up in sitting    · Lower Body Dressing: maximal assistance Pt and  educated and trained in adaptive techniques for LB dressing with pt adhering to left TKA precautions.  Pt's  is able to assist pt with LB dressing until pt can perform adhering to left LE precautions.    · Toileting: Pt needing Mod A for clothing managment.  Pt unable to urinate after 3 in1 commode over toilet transfer so hygiene not evaluated.  Pt is needing heavy Max A for sit to stand from 3 in 1 commode even when it is elevated and pt pushing from bilateral arm rests on 3 in 1 commode.       Cognitive/Visual Perceptual:  Cognitive/Psychosocial Skills:     -       Oriented to: Person, Place, Time and Situation   -       Follows Commands/attention:Follows one-step commands  -       Communication: clear/fluent  -       Memory: No Deficits noted  -       Safety awareness/insight to disability: Fair   -       Mood/Affect/Coping skills/emotional control: Cooperative  Visual/Perceptual: Functional for self care tasks    Physical Exam:  Balance:   Sitting: Good;  Standing - Fair+ with RW; no LOB during ambulation with RW or static standing with RW  Skin integrity: Visible skin intact and Surgical incision left knee covered by dressing  Edema:  Bilateral LE edema which pt stating is her norm; right hand edema due to PIV  infiltration  Sensation: Light touch intact  Upper Extremity Range of Motion:  WFL for self care tasks  Upper Extremity Strength: WFL for self care tasks  Fine Motor Coordination: WFL for self care tasks  Gross motor coordination:WFL for self care tasks    AMPAC 6 Click ADL:  AMPAC Total Score: 16    Treatment & Education:  Role of OT, POC, left TKA precautions and how to adhere to precautions during ADLs and ADL mobility, safety strategies to reduce pt fall risk, caregiver training recommendating caregiver use gait belt and pt have 2 person assist for pt sit to stand.    Education:    Patient left HOB elevated with all lines intact, call button in reach, nurse notified and  present    GOALS:   Multidisciplinary Problems     Occupational Therapy Goals        Problem: Occupational Therapy Goal    Goal Priority Disciplines Outcome Interventions   Occupational Therapy Goal     OT, PT/OT Ongoing, Progressing    Description: Goals to be met by: 20    Patient will increase functional independence with ADLs by performing:    Pt and caregiver verbalize pt's left TKA precautions and how to adhere to TKA precautions during ADLs/ADL mobility. MET  Don underwear at Min A level adhering to TKA precautions.  Don socks at Min A level adhering to TKA precautions.  Don shoes at Min A level adhering to TKA precautions.  Toilet transfers at Min A level adhering to TKA precautions.  Toileting at Min A level adhering to TKA precautions.                         History:     Past Medical History:   Diagnosis Date    Cancer 2018    lung cancer    Diabetes mellitus     Hypertension     Refusal of blood transfusions as patient is [employer]        Past Surgical History:   Procedure Laterality Date    APPENDECTOMY       SECTION      x 3    GALLBLADDER SURGERY      LUNG CANCER SURGERY Right     partial lobectomy    TUBAL LIGATION         Time Tracking:     OT Date of Treatment: 20  OT Start Time:  1018  OT Stop Time: 1054  OT Total Time (min): 36 min      OT Start Time: 1115  OT Stop Time: 1136  OT Total Time (min): 21    Billable Minutes:Evaluation 20  Self Care/Home Management 37    NKECHI Monet  8/28/2020

## 2020-08-28 NOTE — PT/OT/SLP PROGRESS
Physical Therapy Treatment    Patient Name:  Haylie Jameson   MRN:  [id number]    Recommendations:     Discharge Recommendations:  home health PT   Discharge Equipment Recommendations: walker, rolling, bedside commode   Barriers to discharge: None    Assessment:     Haylie Jameson is a 65 y.o. female admitted with a medical diagnosis of <principal problem not specified>.  She presents with the following impairments/functional limitations:  weakness, impaired endurance, impaired self care skills, impaired functional mobilty, gait instability, impaired balance, decreased lower extremity function, pain, decreased ROM, impaired skin, edema, orthopedic precautions   Pt presented supine HOB elevated. Pt agreeable to PT. Supine<>sit ModA x2. Sit<>stand ModA x2. Pt amb'd 140ft CGA w/ RW..    Rehab Prognosis: Good; patient would benefit from acute skilled PT services to address these deficits and reach maximum level of function.    Recent Surgery: Procedure(s) (LRB):  ARTHROPLASTY, KNEE, TOTAL (Left) 1 Day Post-Op    Plan:     During this hospitalization, patient to be seen BID to address the identified rehab impairments via gait training, therapeutic activities, therapeutic exercises and progress toward the following goals:    · Plan of Care Expires:  09/27/20    Subjective     Chief Complaint: none stated  Patient/Family Comments/goals: none stated  Pain/Comfort:  · Pain Rating 1: 0/10  · Location - Side 1: Left  · Location - Orientation 1: generalized  · Location 1: knee  · Pain Addressed 1: Pre-medicate for activity, Reposition, Distraction  · Pain Rating Post-Intervention 1: 0/10      Objective:     Communicated with ns(ca) prior to session.  Patient found HOB elevated with SCD, cryotherapy, peripheral IV, FCD, sandoval catheter upon PT entry to room.     General Precautions: Standard, fall, diabetic   Orthopedic Precautions:LLE weight bearing as tolerated   Braces: N/A     Functional Mobility:  · Transfers:      · Sit to Stand:  moderate assistance and of 2 persons with rolling walker  · Gait: 140ft CGA w/RW      AM-PAC 6 CLICK MOBILITY  Turning over in bed (including adjusting bedclothes, sheets and blankets)?: 3  Sitting down on and standing up from a chair with arms (e.g., wheelchair, bedside commode, etc.): 3  Moving from lying on back to sitting on the side of the bed?: 3  Moving to and from a bed to a chair (including a wheelchair)?: 3  Need to walk in hospital room?: 3  Climbing 3-5 steps with a railing?: 2  Basic Mobility Total Score: 17       Therapeutic Activities and Exercises:   Pt performed AP QS x10    Patient left up in chair with all lines intact, call button in reach and ns notified..    GOALS:   Multidisciplinary Problems     Physical Therapy Goals        Problem: Physical Therapy Goal    Goal Priority Disciplines Outcome Goal Variances Interventions   Physical Therapy Goal     PT, PT/OT Ongoing, Progressing     Description: Goals to be met by: 2020    Patient will increase functional independence with mobility by performin. Sit<>stand with SBA with RW.  2. Gait x 150 feet with RW with SBA.  3. Ascend/descend 1 curb step(s) with least restrictive assistive device and CGA.                       Time Tracking:     PT Received On: 20  PT Start Time: 814     PT Stop Time: 855  PT Total Time (min): 41 min     Billable Minutes: Gait Training 28 and Therapeutic exercise 13    Treatment Type: Treatment  PT/PTA: PTA     PTA Visit Number: Shahrzad Dominguez [facility]  2020

## 2020-08-28 NOTE — PLAN OF CARE
Free from falls, injury, or skin breakdown this hospital admission.  Pt eager & in agreement w/ DC. VU of DC instructions and the need to attend follow-up appointment--paperwork passed & explained. Pain prescription filled and delivered to bedside. IV removed w/ cath tip intact, WNL. Voiding, ambulating, & tolerating PO well. Dressing to Knee dry and intact w/ small amount of drainage noted. To be DCd home w/ family--will be escorted downstairs via  transport team once dressed, ready & ride arrives. Pt discharged in no distress w/ DME for home use. Extra pair of SHERLY hose given.

## 2020-09-17 ENCOUNTER — EXTERNAL HOME HEALTH (OUTPATIENT)
Dept: HOME HEALTH SERVICES | Facility: HOSPITAL | Age: 65
End: 2020-09-17

## 2020-11-04 NOTE — DISCHARGE INSTRUCTIONS
Knee REPLACEMENT DISCHARGE Instructions    1) Pain: After surgery your knee will be sore. The knee will likely have been injected with a numbing medicine (Exparel) prior to completion of surgery for pain control. This is indicated on a green bracelet that you will continue to wear for 4 days after surgery. You will   also get a prescription for pain control before you leave the hospital. Ice and elevation will assist with pain control.    a) Apply ice as much as possible for the first 72 hours. After 72 hours, apply ice for 20minutes 3-4 times a day, after therapy,after exercising or whenever experiencing pain. Avoid direct skin contact with ice to prevent frostbit.          b) Elevate the affected leg with the pillow the length of the leg  to assist with swelling and pain.  2) Incision Care:  a) Some drainage from the incision in the first 72 hours is normal. If drainage is excessive,remove bandage,  pat dry, cover with sterile gauze and secure with tape. Notify physician about excessive drainage. Staples will be removed 14 days after surgery   3) Activity:  a) Perform exercises 2-3 x day.  b)  No tub or hot tub usage.DR KEEN PATIENTS CANNOT SHOWER UNTIL STAPLES ARE REMOVED. Support help is mandatory during showering. If the dressing becomes wet, replace with a new dressing.   c) Wear thigh high yadira hose stockings for 3 weeks after surgery .You may remove stockings for 1- 2 hours during the day only. Send patient home with an extra pair yadira hose.If your physician orders the CPM machine you are to use it for 2 hours in the am and 2 hours in the pm.Increase the flexion 5 degrees each session if tolerated. This is not to replace your exercise program.  4) For lifetime after your replacement surgery, you may need antibiotic coverage before dental or minor surgical procedures.  5) Possible Complications: Call Surgeon  a) Infection: Report these signs and symptoms to your surgeon.  i) Unexpected redness around  incision   ii) Persistent drainage from wound after 72 hours.  iii) Temperature ,can be treated with Tylenol. Do not go to the emergency room or urgent care center, call your surgeon.   iv) Additional swelling  v) Pain not controlled with current pain medication  b) Blood Clot: Report theses signs and symptoms to your surgeon  i) Unusual pain  ii) Red or discolored skin  iii) Swelling in the leg  iv) Unusual warm skin

## 2020-11-18 NOTE — H&P
Subjective:     History of severe arthritis both knees she has had for years.  Became intolerable she had a left knee replacement a few months ago has done quite well now requesting the same on the right hopefully same success    Patient Active Problem List    Diagnosis Date Noted    Preop testing 2020     Past Medical History:   Diagnosis Date    Cancer 2018    lung cancer    Diabetes mellitus     Hypertension     Refusal of blood transfusions as patient is Latter-day       Past Surgical History:   Procedure Laterality Date    APPENDECTOMY       SECTION      x 3    GALLBLADDER SURGERY      LUNG CANCER SURGERY Right     partial lobectomy    TOTAL KNEE ARTHROPLASTY Left 2020    Procedure: ARTHROPLASTY, KNEE, TOTAL;  Surgeon: Bassam Pardo MD;  Location: [facility];  Service: Orthopedics;  Laterality: Left;  ADDUCTOR BLOCK    TUBAL LIGATION        No medications prior to admission.     Review of patient's allergies indicates:  No Known Allergies   Social History     Tobacco Use    Smoking status: Never Smoker    Smokeless tobacco: Never Used   Substance Use Topics    Alcohol use: Never     Frequency: Never      No family history on file.   Review of Systems  Pertinent items are noted in HPI.    Objective:     No data found.  Heart regular lungs clear crepitance tenderness right knee left knee replacement doing well    Imaging Review  Loss of joint space tricompartmental degenerative changes with spur formation    Assessment:     There are no hospital problems to display for this patient.      Plan:     The various methods of treatment have been discussed with the patient and family.   After consideration of risks, benefits and other options for treatment, the patient has consented to surgical interventions (significant risk discussed hopefully same success).  Questions were answered and Pre-op teaching was done by me.

## 2020-11-30 ENCOUNTER — HOSPITAL ENCOUNTER (OUTPATIENT)
Dept: PREADMISSION TESTING | Facility: OTHER | Age: 65
Discharge: HOME | End: 2020-11-30
Attending: ANESTHESIOLOGY
Payer: COMMERCIAL

## 2020-11-30 ENCOUNTER — ANESTHESIA EVENT (OUTPATIENT)
Dept: SURGERY | Facility: OTHER | Age: 65
End: 2020-11-30
Payer: COMMERCIAL

## 2020-11-30 VITALS
OXYGEN SATURATION: 98 % | TEMPERATURE: 98 F | SYSTOLIC BLOOD PRESSURE: 165 MMHG | WEIGHT: 227 LBS | DIASTOLIC BLOOD PRESSURE: 80 MMHG | HEART RATE: 77 BPM | RESPIRATION RATE: 16 BRPM | BODY MASS INDEX: 36.48 KG/M2 | HEIGHT: 66 IN

## 2020-11-30 DIAGNOSIS — Z01.818 PREOP TESTING: ICD-10-CM

## 2020-11-30 LAB
ANION GAP SERPL CALC-SCNC: 11 MMOL/L (ref 8–16)
BASOPHILS # BLD AUTO: 0.03 K/UL (ref 0–0.2)
BASOPHILS NFR BLD: 0.3 % (ref 0–1.9)
BILIRUB UR QL STRIP: NEGATIVE
BUN SERPL-MCNC: 16 MG/DL (ref 8–23)
CALCIUM SERPL-MCNC: 9.2 MG/DL (ref 8.7–10.5)
CHLORIDE SERPL-SCNC: 106 MMOL/L (ref 95–110)
CLARITY UR: CLEAR
CO2 SERPL-SCNC: 25 MMOL/L (ref 23–29)
COLOR UR: YELLOW
CREAT SERPL-MCNC: 0.8 MG/DL (ref 0.5–1.4)
DIFFERENTIAL METHOD: ABNORMAL
EOSINOPHIL # BLD AUTO: 0.2 K/UL (ref 0–0.5)
EOSINOPHIL NFR BLD: 2.1 % (ref 0–8)
ERYTHROCYTE [DISTWIDTH] IN BLOOD BY AUTOMATED COUNT: 14.1 % (ref 11.5–14.5)
EST. GFR  (AFRICAN AMERICAN): >60 ML/MIN/1.73 M^2
EST. GFR  (NON AFRICAN AMERICAN): >60 ML/MIN/1.73 M^2
GLUCOSE SERPL-MCNC: 117 MG/DL (ref 70–110)
GLUCOSE UR QL STRIP: NEGATIVE
HCT VFR BLD AUTO: 39.1 % (ref 37–48.5)
HGB BLD-MCNC: 12.3 G/DL (ref 12–16)
HGB UR QL STRIP: NEGATIVE
IMM GRANULOCYTES # BLD AUTO: 0.05 K/UL (ref 0–0.04)
IMM GRANULOCYTES NFR BLD AUTO: 0.5 % (ref 0–0.5)
KETONES UR QL STRIP: NEGATIVE
LEUKOCYTE ESTERASE UR QL STRIP: NEGATIVE
LYMPHOCYTES # BLD AUTO: 1.7 K/UL (ref 1–4.8)
LYMPHOCYTES NFR BLD: 16 % (ref 18–48)
MCH RBC QN AUTO: 26.7 PG (ref 27–31)
MCHC RBC AUTO-ENTMCNC: 31.5 G/DL (ref 32–36)
MCV RBC AUTO: 85 FL (ref 82–98)
MONOCYTES # BLD AUTO: 0.8 K/UL (ref 0.3–1)
MONOCYTES NFR BLD: 7.3 % (ref 4–15)
NEUTROPHILS # BLD AUTO: 7.9 K/UL (ref 1.8–7.7)
NEUTROPHILS NFR BLD: 73.8 % (ref 38–73)
NITRITE UR QL STRIP: NEGATIVE
NRBC BLD-RTO: 0 /100 WBC
PH UR STRIP: 7 [PH] (ref 5–8)
PLATELET # BLD AUTO: 389 K/UL (ref 150–350)
PMV BLD AUTO: 9.5 FL (ref 9.2–12.9)
POTASSIUM SERPL-SCNC: 4.3 MMOL/L (ref 3.5–5.1)
PROT UR QL STRIP: NEGATIVE
RBC # BLD AUTO: 4.61 M/UL (ref 4–5.4)
SODIUM SERPL-SCNC: 142 MMOL/L (ref 136–145)
SP GR UR STRIP: 1.01 (ref 1–1.03)
URN SPEC COLLECT METH UR: NORMAL
UROBILINOGEN UR STRIP-ACNC: NEGATIVE EU/DL
WBC # BLD AUTO: 10.73 K/UL (ref 3.9–12.7)

## 2020-11-30 PROCEDURE — 36415 COLL VENOUS BLD VENIPUNCTURE: CPT

## 2020-11-30 PROCEDURE — 81003 URINALYSIS AUTO W/O SCOPE: CPT

## 2020-11-30 PROCEDURE — 80048 BASIC METABOLIC PNL TOTAL CA: CPT

## 2020-11-30 PROCEDURE — U0003 INFECTIOUS AGENT DETECTION BY NUCLEIC ACID (DNA OR RNA); SEVERE ACUTE RESPIRATORY SYNDROME CORONAVIRUS 2 (SARS-COV-2) (CORONAVIRUS DISEASE [COVID-19]), AMPLIFIED PROBE TECHNIQUE, MAKING USE OF HIGH THROUGHPUT TECHNOLOGIES AS DESCRIBED BY CMS-2020-01-R: HCPCS

## 2020-11-30 PROCEDURE — 85025 COMPLETE CBC W/AUTO DIFF WBC: CPT

## 2020-11-30 RX ORDER — SODIUM CHLORIDE, SODIUM LACTATE, POTASSIUM CHLORIDE, CALCIUM CHLORIDE 600; 310; 30; 20 MG/100ML; MG/100ML; MG/100ML; MG/100ML
INJECTION, SOLUTION INTRAVENOUS CONTINUOUS
Status: CANCELLED | OUTPATIENT
Start: 2020-11-30

## 2020-11-30 NOTE — ANESTHESIA PREPROCEDURE EVALUATION
11/30/2020  Haylie Jameson is a 65 y.o., female.    Anesthesia Evaluation    I have reviewed the Patient Summary Reports.    I have reviewed the Nursing Notes. I have reviewed the NPO Status.   I have reviewed the Medications.     Review of Systems  Anesthesia Hx:  Denies Family Hx of Anesthesia complications.   Denies Personal Hx of Anesthesia complications.   Hematology/Oncology:  Hematology Normal       -- Cancer in past history (Lung):    Cardiovascular:   Exercise tolerance: poor Hypertension CAD   See cardiology annually.  Will get old records   Pulmonary:   S/p pulmonary wedge resection on R   Renal/:  Renal/ Normal     Hepatic/GI:  Hepatic/GI Normal    Musculoskeletal:   Arthritis     Neurological:  Neurology Normal    Endocrine:   Diabetes        Physical Exam  General:  Obesity    Airway/Jaw/Neck:  Airway Findings: Mouth Opening: Normal Tongue: Normal  General Airway Assessment: Adult  Mallampati: II  TM Distance: Normal, at least 6 cm      Dental:  Dental Findings: In tact, Upper Dentures   Chest/Lungs:  Chest/Lungs Clear    Heart/Vascular:  Heart Findings: Normal       Mental Status:  Mental Status Findings:  Cooperative         Anesthesia Plan  Type of Anesthesia, risks & benefits discussed:  Anesthesia Type:  spinal  Patient's Preference:   Intra-op Monitoring Plan: standard ASA monitors  Intra-op Monitoring Plan Comments:   Post Op Pain Control Plan: per primary service following discharge from PACU and peripheral nerve block  Post Op Pain Control Plan Comments:   Induction:    Beta Blocker:         Informed Consent: Patient understands risks and agrees with Anesthesia plan.  Questions answered. Anesthesia consent signed with patient.  ASA Score: 3     Day of Surgery Review of History & Physical:    H&P update referred to the surgeon.     Anesthesia Plan Notes: Labs/EKG.  Pt is  Evangelical.  NO PRBC's, albumin OK    :Cardiology note on paper chart [facility]    Did well in Aug with spinal for other knee        Ready For Surgery From Anesthesia Perspective.

## 2020-11-30 NOTE — DISCHARGE INSTRUCTIONS
Information to Prepare you for your Surgery    PRE-ADMIT TESTING -  [phone]    [address]  [place] [facility]          Your surgery has been scheduled at [facility]. We are pleased to have the opportunity to serve you. For Further Information please call [phone].    On the day of surgery please report to the Information Desk on the 1st floor.    · CONTACT YOUR PHYSICIAN'S OFFICE THE DAY PRIOR TO YOUR SURGERY TO OBTAIN YOUR ARRIVAL TIME.     · The evening before surgery do not eat anything after 9 p.m. ( this includes hard candy, chewing gum and mints).  You may only have GATORADE, POWERADE AND WATER  from 9 p.m. until you leave your home.   DO NOT DRINK ANY LIQUIDS ON THE WAY TO THE HOSPITAL.      SPECIAL MEDICATION INSTRUCTIONS: TAKE medications checked off by the Anesthesiologist on your Medication List.    Angiogram Patients: Take medications as instructed by your physician, including aspirin.     Surgery Patients:    If you take ASPIRIN - Your PHYSICIAN/SURGEON will need to inform you IF/OR when you need to stop taking aspirin prior to your surgery.     Do Not take any medications containing IBUPROFEN.  Do Not Wear any make-up or dark nail polish   (especially eye make-up) to surgery. If you come to surgery with makeup on you will be required to remove the makeup or nail polish.    Do not shave your surgical area at least 5 days prior to your surgery. The surgical prep will be performed at the hospital according to Infection Control regulations.    Leave all valuables at home.   Do Not wear any jewelry or watches, including any metal in body piercings. Jewelry must be removed prior to coming to the hospital.  There is a possibility that rings that are unable to be removed may be cut off if they are on the surgical extremity.    Contact Lens must be removed before surgery. Either do not wear the contact lens or bring a case and solution for  storage.  Please bring a container for eyeglasses or dentures as required.  Bring any paperwork your physician has provided, such as consent forms,  history and physicals, doctor's orders, etc.   Bring comfortable clothes that are loose fitting to wear upon discharge. Take into consideration the type of surgery being performed.  Maintain your diet as advised per your physician the day prior to surgery.      Adequate rest the night before surgery is advised.   Park in the Parking lot behind the hospital or in the [facility] across the street from the parking lot. Parking is complimentary.  If you will be discharged the same day as your procedure, please arrange for a responsible adult to drive you home or to accompany you if traveling by taxi.   YOU WILL NOT BE PERMITTED TO DRIVE OR TO LEAVE THE HOSPITAL ALONE AFTER SURGERY.   If you are being discharged the same day, it is strongly recommended that you arrange for someone to remain with you for the first 24 hrs following your surgery.    The Surgeon will speak to your family/visitor after your surgery regarding the outcome of your surgery and post op care.  The Surgeon may speak to you after your surgery, but there is a possibility you may not remember the details.  Please check with your family members regarding the conversation with the Surgeon.    We strongly recommend whoever is bringing you home be present for discharge instructions.  This will ensure a thorough understanding for your post op home care.    ALL CHILDREN MUST ALWAYS BE ACCOMPANIED BY AN ADULT.    Visitors-Refer to current Visitor policy handouts.    Thank you for your cooperation.  The Staff of [facility].                Bathing Instructions with Hibiclens     Shower the evening before and morning of your procedure with Hibiclens:   Wash your face with water and your regular face wash/soap   Apply Hibiclens directly on your skin or on a wet washcloth and wash  gently. When showering: Move away from the shower stream when applying Hibiclens to avoid rinsing off too soon.   Rinse thoroughly with warm water   Do not dilute Hibiclens         Dry off as usual, do not use any deodorant, powder, body lotions, perfume, after shave or cologne.

## 2020-12-01 LAB — SARS-COV-2 RNA RESP QL NAA+PROBE: NOT DETECTED

## 2020-12-03 ENCOUNTER — HOSPITAL ENCOUNTER (OUTPATIENT)
Facility: OTHER | Age: 65
Discharge: HOME | End: 2020-12-04
Attending: ORTHOPAEDIC SURGERY | Admitting: ORTHOPAEDIC SURGERY
Payer: MEDICARE

## 2020-12-03 ENCOUNTER — ANESTHESIA (OUTPATIENT)
Dept: SURGERY | Facility: OTHER | Age: 65
End: 2020-12-03
Payer: COMMERCIAL

## 2020-12-03 DIAGNOSIS — M17.9 OA (OSTEOARTHRITIS) OF KNEE: ICD-10-CM

## 2020-12-03 DIAGNOSIS — Z01.818 PREOP TESTING: ICD-10-CM

## 2020-12-03 LAB
POCT GLUCOSE: 140 MG/DL (ref 70–110)
POCT GLUCOSE: 152 MG/DL (ref 70–110)

## 2020-12-03 PROCEDURE — C1713 ANCHOR/SCREW BN/BN,TIS/BN: HCPCS | Performed by: ORTHOPAEDIC SURGERY

## 2020-12-03 PROCEDURE — 97110 THERAPEUTIC EXERCISES: CPT

## 2020-12-03 PROCEDURE — C9290 INJ, BUPIVACAINE LIPOSOME: HCPCS | Performed by: ORTHOPAEDIC SURGERY

## 2020-12-03 PROCEDURE — 36000710: Performed by: ORTHOPAEDIC SURGERY

## 2020-12-03 PROCEDURE — 25000003 PHARM REV CODE 250: Performed by: ORTHOPAEDIC SURGERY

## 2020-12-03 PROCEDURE — 37000009 HC ANESTHESIA EA ADD 15 MINS: Performed by: ORTHOPAEDIC SURGERY

## 2020-12-03 PROCEDURE — 36000711: Performed by: ORTHOPAEDIC SURGERY

## 2020-12-03 PROCEDURE — 63600175 PHARM REV CODE 636 W HCPCS: Performed by: ORTHOPAEDIC SURGERY

## 2020-12-03 PROCEDURE — 37000008 HC ANESTHESIA 1ST 15 MINUTES: Performed by: ORTHOPAEDIC SURGERY

## 2020-12-03 PROCEDURE — 63600175 PHARM REV CODE 636 W HCPCS: Performed by: ANESTHESIOLOGY

## 2020-12-03 PROCEDURE — 25000003 PHARM REV CODE 250: Performed by: ANESTHESIOLOGY

## 2020-12-03 PROCEDURE — 94761 N-INVAS EAR/PLS OXIMETRY MLT: CPT

## 2020-12-03 PROCEDURE — 97162 PT EVAL MOD COMPLEX 30 MIN: CPT

## 2020-12-03 PROCEDURE — 64447 NJX AA&/STRD FEMORAL NRV IMG: CPT | Mod: 59 | Performed by: SPECIALIST

## 2020-12-03 PROCEDURE — 97116 GAIT TRAINING THERAPY: CPT

## 2020-12-03 PROCEDURE — 94799 UNLISTED PULMONARY SVC/PX: CPT

## 2020-12-03 PROCEDURE — 71000033 HC RECOVERY, INTIAL HOUR: Performed by: ORTHOPAEDIC SURGERY

## 2020-12-03 PROCEDURE — 63600175 PHARM REV CODE 636 W HCPCS: Performed by: NURSE ANESTHETIST, CERTIFIED REGISTERED

## 2020-12-03 PROCEDURE — 82962 GLUCOSE BLOOD TEST: CPT | Performed by: ORTHOPAEDIC SURGERY

## 2020-12-03 PROCEDURE — 71000039 HC RECOVERY, EACH ADD'L HOUR: Performed by: ORTHOPAEDIC SURGERY

## 2020-12-03 PROCEDURE — 63600175 PHARM REV CODE 636 W HCPCS: Performed by: SPECIALIST

## 2020-12-03 PROCEDURE — C1776 JOINT DEVICE (IMPLANTABLE): HCPCS | Performed by: ORTHOPAEDIC SURGERY

## 2020-12-03 PROCEDURE — 27201423 OPTIME MED/SURG SUP & DEVICES STERILE SUPPLY: Performed by: ORTHOPAEDIC SURGERY

## 2020-12-03 PROCEDURE — 25000003 PHARM REV CODE 250: Performed by: NURSE ANESTHETIST, CERTIFIED REGISTERED

## 2020-12-03 DEVICE — COMP FEM POST STBL 62.5MM R: Type: IMPLANTABLE DEVICE | Site: KNEE | Status: FUNCTIONAL

## 2020-12-03 DEVICE — PATELLA COMPONENT3PEG 34X8.5MM: Type: IMPLANTABLE DEVICE | Site: KNEE | Status: FUNCTIONAL

## 2020-12-03 DEVICE — CEMENT REFOBACIN BCR 1X40: Type: IMPLANTABLE DEVICE | Site: KNEE | Status: FUNCTIONAL

## 2020-12-03 DEVICE — INSERT TIBIAL BEARING 10 X 71/: Type: IMPLANTABLE DEVICE | Site: KNEE | Status: FUNCTIONAL

## 2020-12-03 DEVICE — TIBIAL TRAY CRUCIATE 71MM: Type: IMPLANTABLE DEVICE | Site: KNEE | Status: FUNCTIONAL

## 2020-12-03 RX ORDER — MIDAZOLAM HYDROCHLORIDE 1 MG/ML
2 INJECTION INTRAMUSCULAR; INTRAVENOUS
Status: COMPLETED | OUTPATIENT
Start: 2020-12-03 | End: 2020-12-03

## 2020-12-03 RX ORDER — FENTANYL CITRATE 50 UG/ML
100 INJECTION, SOLUTION INTRAMUSCULAR; INTRAVENOUS EVERY 5 MIN PRN
Status: DISCONTINUED | OUTPATIENT
Start: 2020-12-03 | End: 2020-12-03 | Stop reason: HOSPADM

## 2020-12-03 RX ORDER — LIDOCAINE HCL/PF 100 MG/5ML
SYRINGE (ML) INTRAVENOUS
Status: DISCONTINUED | OUTPATIENT
Start: 2020-12-03 | End: 2020-12-03

## 2020-12-03 RX ORDER — DEXTROSE MONOHYDRATE AND SODIUM CHLORIDE 5; .9 G/100ML; G/100ML
INJECTION, SOLUTION INTRAVENOUS CONTINUOUS
Status: DISCONTINUED | OUTPATIENT
Start: 2020-12-03 | End: 2020-12-04

## 2020-12-03 RX ORDER — SODIUM CHLORIDE, SODIUM LACTATE, POTASSIUM CHLORIDE, CALCIUM CHLORIDE 600; 310; 30; 20 MG/100ML; MG/100ML; MG/100ML; MG/100ML
INJECTION, SOLUTION INTRAVENOUS CONTINUOUS
Status: DISCONTINUED | OUTPATIENT
Start: 2020-12-03 | End: 2020-12-04

## 2020-12-03 RX ORDER — METFORMIN HYDROCHLORIDE 500 MG/1
500 TABLET, EXTENDED RELEASE ORAL DAILY
Status: DISCONTINUED | OUTPATIENT
Start: 2020-12-03 | End: 2020-12-04 | Stop reason: HOSPADM

## 2020-12-03 RX ORDER — NEBIVOLOL 10 MG/1
20 TABLET ORAL DAILY
Status: DISCONTINUED | OUTPATIENT
Start: 2020-12-03 | End: 2020-12-04 | Stop reason: HOSPADM

## 2020-12-03 RX ORDER — SODIUM CHLORIDE 9 MG/ML
INJECTION, SOLUTION INTRAVENOUS CONTINUOUS
Status: DISCONTINUED | OUTPATIENT
Start: 2020-12-03 | End: 2020-12-04

## 2020-12-03 RX ORDER — MUPIROCIN 20 MG/G
1 OINTMENT TOPICAL 2 TIMES DAILY
Status: DISCONTINUED | OUTPATIENT
Start: 2020-12-03 | End: 2020-12-04 | Stop reason: HOSPADM

## 2020-12-03 RX ORDER — TRANEXAMIC ACID 100 MG/ML
INJECTION, SOLUTION INTRAVENOUS
Status: DISCONTINUED | OUTPATIENT
Start: 2020-12-03 | End: 2020-12-03 | Stop reason: HOSPADM

## 2020-12-03 RX ORDER — SODIUM CHLORIDE 0.9 % (FLUSH) 0.9 %
3 SYRINGE (ML) INJECTION
Status: DISCONTINUED | OUTPATIENT
Start: 2020-12-03 | End: 2020-12-04 | Stop reason: HOSPADM

## 2020-12-03 RX ORDER — LISINOPRIL 5 MG/1
5 TABLET ORAL DAILY
Status: DISCONTINUED | OUTPATIENT
Start: 2020-12-03 | End: 2020-12-04 | Stop reason: HOSPADM

## 2020-12-03 RX ORDER — PROPOFOL 10 MG/ML
VIAL (ML) INTRAVENOUS CONTINUOUS PRN
Status: DISCONTINUED | OUTPATIENT
Start: 2020-12-03 | End: 2020-12-03

## 2020-12-03 RX ORDER — ONDANSETRON 2 MG/ML
4 INJECTION INTRAMUSCULAR; INTRAVENOUS DAILY PRN
Status: DISCONTINUED | OUTPATIENT
Start: 2020-12-03 | End: 2020-12-03 | Stop reason: HOSPADM

## 2020-12-03 RX ORDER — SODIUM CHLORIDE 0.9 % (FLUSH) 0.9 %
5 SYRINGE (ML) INJECTION
Status: DISCONTINUED | OUTPATIENT
Start: 2020-12-03 | End: 2020-12-04 | Stop reason: HOSPADM

## 2020-12-03 RX ORDER — OXYCODONE HYDROCHLORIDE 5 MG/1
5 TABLET ORAL
Status: DISCONTINUED | OUTPATIENT
Start: 2020-12-03 | End: 2020-12-03 | Stop reason: HOSPADM

## 2020-12-03 RX ORDER — DILTIAZEM HYDROCHLORIDE 180 MG/1
180 CAPSULE, COATED, EXTENDED RELEASE ORAL DAILY
Status: DISCONTINUED | OUTPATIENT
Start: 2020-12-03 | End: 2020-12-04 | Stop reason: HOSPADM

## 2020-12-03 RX ORDER — SIMVASTATIN 10 MG/1
40 TABLET, FILM COATED ORAL NIGHTLY
Status: DISCONTINUED | OUTPATIENT
Start: 2020-12-03 | End: 2020-12-04 | Stop reason: HOSPADM

## 2020-12-03 RX ORDER — MORPHINE SULFATE 4 MG/ML
4 INJECTION, SOLUTION INTRAMUSCULAR; INTRAVENOUS
Status: DISCONTINUED | OUTPATIENT
Start: 2020-12-03 | End: 2020-12-04 | Stop reason: HOSPADM

## 2020-12-03 RX ORDER — NAPROXEN SODIUM 220 MG/1
81 TABLET, FILM COATED ORAL 2 TIMES DAILY
Status: DISCONTINUED | OUTPATIENT
Start: 2020-12-03 | End: 2020-12-04 | Stop reason: HOSPADM

## 2020-12-03 RX ORDER — PROMETHAZINE HYDROCHLORIDE 12.5 MG/1
25 TABLET ORAL EVERY 6 HOURS PRN
Status: DISCONTINUED | OUTPATIENT
Start: 2020-12-03 | End: 2020-12-04 | Stop reason: HOSPADM

## 2020-12-03 RX ORDER — CEFAZOLIN SODIUM 1 G/3ML
2 INJECTION, POWDER, FOR SOLUTION INTRAMUSCULAR; INTRAVENOUS
Status: COMPLETED | OUTPATIENT
Start: 2020-12-03 | End: 2020-12-03

## 2020-12-03 RX ORDER — POLYETHYLENE GLYCOL 3350 17 G/17G
17 POWDER, FOR SOLUTION ORAL DAILY
Status: DISCONTINUED | OUTPATIENT
Start: 2020-12-03 | End: 2020-12-04 | Stop reason: HOSPADM

## 2020-12-03 RX ORDER — ROPIVACAINE HYDROCHLORIDE 5 MG/ML
INJECTION, SOLUTION EPIDURAL; INFILTRATION; PERINEURAL
Status: COMPLETED | OUTPATIENT
Start: 2020-12-03 | End: 2020-12-03

## 2020-12-03 RX ORDER — RANOLAZINE 500 MG/1
500 TABLET, EXTENDED RELEASE ORAL 2 TIMES DAILY
Status: DISCONTINUED | OUTPATIENT
Start: 2020-12-03 | End: 2020-12-04 | Stop reason: HOSPADM

## 2020-12-03 RX ORDER — HYDROMORPHONE HYDROCHLORIDE 2 MG/ML
0.4 INJECTION, SOLUTION INTRAMUSCULAR; INTRAVENOUS; SUBCUTANEOUS EVERY 5 MIN PRN
Status: DISCONTINUED | OUTPATIENT
Start: 2020-12-03 | End: 2020-12-03 | Stop reason: HOSPADM

## 2020-12-03 RX ORDER — HYDROCODONE BITARTRATE AND ACETAMINOPHEN 10; 325 MG/1; MG/1
1 TABLET ORAL EVERY 4 HOURS PRN
Status: DISCONTINUED | OUTPATIENT
Start: 2020-12-03 | End: 2020-12-04 | Stop reason: HOSPADM

## 2020-12-03 RX ORDER — GLIMEPIRIDE 2 MG/1
2 TABLET ORAL 2 TIMES DAILY
Status: DISCONTINUED | OUTPATIENT
Start: 2020-12-03 | End: 2020-12-04 | Stop reason: HOSPADM

## 2020-12-03 RX ORDER — ONDANSETRON 8 MG/1
8 TABLET, ORALLY DISINTEGRATING ORAL EVERY 8 HOURS PRN
Status: DISCONTINUED | OUTPATIENT
Start: 2020-12-03 | End: 2020-12-04 | Stop reason: HOSPADM

## 2020-12-03 RX ORDER — HYDROCODONE BITARTRATE AND ACETAMINOPHEN 5; 325 MG/1; MG/1
1 TABLET ORAL EVERY 4 HOURS PRN
Status: DISCONTINUED | OUTPATIENT
Start: 2020-12-03 | End: 2020-12-04 | Stop reason: HOSPADM

## 2020-12-03 RX ORDER — MELATONIN 1 MG/ML
8 LIQUID (ML) ORAL NIGHTLY PRN
Status: DISCONTINUED | OUTPATIENT
Start: 2020-12-03 | End: 2020-12-04 | Stop reason: HOSPADM

## 2020-12-03 RX ORDER — MEPERIDINE HYDROCHLORIDE 25 MG/ML
12.5 INJECTION INTRAMUSCULAR; INTRAVENOUS; SUBCUTANEOUS ONCE AS NEEDED
Status: DISCONTINUED | OUTPATIENT
Start: 2020-12-03 | End: 2020-12-03 | Stop reason: HOSPADM

## 2020-12-03 RX ORDER — CEFAZOLIN SODIUM 2 G/50ML
2 SOLUTION INTRAVENOUS
Status: COMPLETED | OUTPATIENT
Start: 2020-12-03 | End: 2020-12-04

## 2020-12-03 RX ADMIN — ASPIRIN 81 MG: 81 TABLET, CHEWABLE ORAL at 08:12

## 2020-12-03 RX ADMIN — MORPHINE SULFATE 4 MG: 4 INJECTION, SOLUTION INTRAMUSCULAR; INTRAVENOUS at 08:12

## 2020-12-03 RX ADMIN — MORPHINE SULFATE 4 MG: 4 INJECTION, SOLUTION INTRAMUSCULAR; INTRAVENOUS at 03:12

## 2020-12-03 RX ADMIN — ROPIVACAINE HYDROCHLORIDE 25 ML: 5 INJECTION, SOLUTION EPIDURAL; INFILTRATION; PERINEURAL at 10:12

## 2020-12-03 RX ADMIN — SODIUM CHLORIDE, SODIUM LACTATE, POTASSIUM CHLORIDE, AND CALCIUM CHLORIDE: 600; 310; 30; 20 INJECTION, SOLUTION INTRAVENOUS at 12:12

## 2020-12-03 RX ADMIN — CEFAZOLIN 2 G: 330 INJECTION, POWDER, FOR SOLUTION INTRAMUSCULAR; INTRAVENOUS at 11:12

## 2020-12-03 RX ADMIN — DEXTROSE AND SODIUM CHLORIDE 75 ML/HR: 5; .9 INJECTION, SOLUTION INTRAVENOUS at 03:12

## 2020-12-03 RX ADMIN — MUPIROCIN 1 G: 20 OINTMENT TOPICAL at 08:12

## 2020-12-03 RX ADMIN — DEXTROSE 2 G: 50 INJECTION, SOLUTION INTRAVENOUS at 09:12

## 2020-12-03 RX ADMIN — SIMVASTATIN 40 MG: 10 TABLET, FILM COATED ORAL at 08:12

## 2020-12-03 RX ADMIN — OXYCODONE HYDROCHLORIDE 5 MG: 5 TABLET ORAL at 02:12

## 2020-12-03 RX ADMIN — MIDAZOLAM HYDROCHLORIDE 2 MG: 1 INJECTION, SOLUTION INTRAMUSCULAR; INTRAVENOUS at 10:12

## 2020-12-03 RX ADMIN — ROPIVACAINE HYDROCHLORIDE 3 ML: 5 INJECTION, SOLUTION EPIDURAL; INFILTRATION; PERINEURAL at 11:12

## 2020-12-03 RX ADMIN — MORPHINE SULFATE 4 MG: 4 INJECTION, SOLUTION INTRAMUSCULAR; INTRAVENOUS at 05:12

## 2020-12-03 RX ADMIN — GLIMEPIRIDE 2 MG: 2 TABLET ORAL at 08:12

## 2020-12-03 RX ADMIN — LIDOCAINE HYDROCHLORIDE 100 MG: 20 INJECTION, SOLUTION INTRAVENOUS at 12:12

## 2020-12-03 RX ADMIN — RANOLAZINE 500 MG: 500 TABLET, FILM COATED, EXTENDED RELEASE ORAL at 08:12

## 2020-12-03 RX ADMIN — POLYETHYLENE GLYCOL 3350 17 G: 17 POWDER, FOR SOLUTION ORAL at 03:12

## 2020-12-03 RX ADMIN — HYDROCODONE BITARTRATE AND ACETAMINOPHEN 1 TABLET: 10; 325 TABLET ORAL at 07:12

## 2020-12-03 RX ADMIN — SODIUM CHLORIDE, SODIUM LACTATE, POTASSIUM CHLORIDE, AND CALCIUM CHLORIDE: 600; 310; 30; 20 INJECTION, SOLUTION INTRAVENOUS at 10:12

## 2020-12-03 RX ADMIN — PROPOFOL 100 MCG/KG/MIN: 10 INJECTION, EMULSION INTRAVENOUS at 11:12

## 2020-12-03 NOTE — ANESTHESIA PROCEDURE NOTES
Peripheral Block    Patient location during procedure: holding area   Block not for primary anesthetic.  Reason for block: at surgeon's request and post-op pain management   Post-op Pain Location: R KNEE  Start time: 12/3/2020 10:48 AM  Timeout: 12/3/2020 10:48 AM   End time: 12/3/2020 10:56 AM    Staffing  Authorizing Provider: Carmelo Collins MD  Performing Provider: Carmelo Collins MD    Preanesthetic Checklist  Completed: patient identified, site marked, surgical consent, pre-op evaluation, timeout performed, IV checked, risks and benefits discussed and monitors and equipment checked  Peripheral Block  Patient position: supine  Prep: ChloraPrep and site prepped and draped  Patient monitoring: heart rate, cardiac monitor, continuous pulse ox and frequent blood pressure checks  Block type: adductor canal  Laterality: right  Injection technique: single shot  Needle  Needle type: Stimuplex   Needle gauge: 21 G  Needle length: 3.5 in  Needle localization: anatomical landmarks and ultrasound guidance   -ultrasound image captured on disc.  Assessment  Injection assessment: negative aspiration, negative parasthesia and local visualized surrounding nerve  Paresthesia pain: none  Heart rate change: no  Slow fractionated injection: yes

## 2020-12-03 NOTE — PT/OT/SLP EVAL
Physical Therapy Evaluation and Treatment    Patient Name:  Haylie Jameson   MRN:  [id number]    Recommendations:     Discharge Recommendations:  home health PT, home health OT   Discharge Equipment Recommendations: none(has equipment from previous TKA)   Barriers to discharge: Inaccessible home    Assessment:     Haylie Jameson is a 65 y.o. female admitted with a medical diagnosis of OA (osteoarthritis) of knee. Pmhx significant for L TKA (October 2020), lung ca, DM, and HTN.  She presents with the following impairments/functional limitations:  weakness, impaired self care skills, impaired functional mobilty, gait instability, impaired balance, decreased coordination, decreased lower extremity function, decreased safety awareness, pain, decreased ROM, impaired skin, edema, orthopedic precautions.    Patient evaluated by PT and goals established. Patient with severe pain during therapy session, received IV morphine prior to OOB mobility with minimal effect on pain and onset of nausea. AAROM knee flexion ROM 5-80 with max knee flexion achieved with stand>sit. Bed mobility with Abrahan, sit<>stand with Abrahan with RW, and amb x 58 ft with RW and CGA.  PT will continue to follow and progress as tolerated. Rec for d/c to home with HH PT/OT and assistance as needed.     Rehab Prognosis: Good; patient would benefit from acute skilled PT services to address these deficits and reach maximum level of function.    Recent Surgery: Procedure(s) (LRB):  ARTHROPLASTY, KNEE, TOTAL (Right) Day of Surgery    Plan:     During this hospitalization, patient to be seen BID to address the identified rehab impairments via gait training, therapeutic activities, therapeutic exercises and progress toward the following goals:    · Plan of Care Expires:  12/10/20    Subjective     Chief Complaint: Increasing pain in knee despite oral pain meds, then nauseous after IV morphine  Patient/Family Comments/goals: Goal to be able to run after  her grandbaby (3 yo); Patient agreeable to evaluation.  Pain/Comfort:  · Pain Rating 1: 8/10  · Location - Side 1: Right  · Location 1: knee  · Pain Addressed 1: Pre-medicate for activity, Reposition, Distraction, Cessation of Activity, Nurse notified(ice donned)  · Pain Rating Post-Intervention 1: 6/10    Patients cultural, spiritual, Holiness conflicts given the current situation: no    Living Environment:  · Pt lives with her  in a single story home with 1 threshold steps to enter.   · Upon discharge, patient will have assistance from her .  Prior level of function:  · Ambulation: Indep  · Progressed from RW to no AD after L TKA  · ADL's: Indep  · IADLs: Indep  · Equipment used at home: none.  DME owned (not currently used): rolling walker and bedside commode.     Objective:     Communicated with SUSIE Francis prior to session.  Patient found HOB elevated leaning towards L with peripheral IV, sandoval catheter, FCD  upon PT entry to room.    General Precautions: Standard, fall, diabetic   Orthopedic Precautions:RLE weight bearing as tolerated   Braces: N/A     Patient donned yellow socks and gait belt for OOB mobility.     Exams:  · Cognition:   · Patient is oriented to name, , date, place, situation.  · Pt follows approximately 100% of one step commands.    · Mood: Pleasant and cooperative.   · Musculoskeletal:  · Posture: Protective guarding surgical joint  · LE ROM/Strength: 5/5 bilateral hip flexion, nonsurgical knee extension, bilateral ankle dorsiflexion. AROM surgical knee difficult to accurately assess with large bulky dressing, although knee flexion grossly 5-80 degrees (max 30 deg in bed, 80 deg seated EOB). Surgical knee strength grossly 5/5 knee flexion and 3-/5 knee extension.  · Neuromuscular:  · Sensation: Intact to light touch bilateral LEs. Pt denied paresthesias.   · Coordination/Tone/Reflexes: No impairments identified with functional mobility. No formal testing performed.    · Balance: CGA for dynamic standing with bilateral UE support.   · Visual-vestibular: No impairments identified with functional mobility. No formal testing performed.  · Integument:  Visible skin intact and surgical extremity dressing clean and dry.   · Cardiopulmonary:  · Edema: Mild surgical extremity.    · Color/temperature/pulses: Equal      Functional Mobility:  · Bed Mobility:     · Supine to Sit: minimum assistance  · Sit to Supine: minimum assistance - required assistance to lift RLE to bed  · Transfers:     · Sit to Stand:  minimum assistance with rolling walker  · Initially maintains surgical knee extended, with encouragement and manual assistance able to flex knee for flat foot placement  · Instructed to perform stand>sit slowly without moving foot anterior if able to tolerate, pt performed with slight anterior shift towards end of transfer with good eccentric control  · Gait: x58 ft with RW and CGA.   · Gait deviations of decreased stride length with step to gait pattern, decreased surgical knee flexion, and decreased surgical heel strike/toe off. Increased double limb support time. With verbal cues noted improvements in step through gait pattern.   · Despite cues, pt maintains knee extended during swing    Therapeutic Activities and Exercises:  ·  Pt performed supine therapeutic exercises including ankle pumps, quad sets, glute sets, SAQs x 10 reps with verbal and tactile cues - due to nausea deferred other TKA therEx  · Pt performed seated therapeutic exercises including LAQs with focus on knee flexion to end range x 5 reps with verbal and tactile cues.  · Gait and transfers as above  PT educated patient and spouse re:   PT plan of care/role of PT  Safety with OOB mobility  Use of RW  Positioning of LE and use of ice  Orthopedic precautions  Gait deviations  Therapeutic exercises  Discharge disposition    Pt and spouse verbalized understanding       AM-PAC 6 CLICK MOBILITY  Total Score:17     Patient  left HOB elevated with all lines intact, call button in reach, bed alarm on, RN Tito notified, spouse present and ice donned and instructed pt to avoid leaning to side to prevent knee from twsiting.    GOALS:   Multidisciplinary Problems     Physical Therapy Goals        Problem: Physical Therapy Goal    Goal Priority Disciplines Outcome Goal Variances Interventions   Physical Therapy Goal     PT, PT/OT Ongoing, Progressing     Description: Goals to be met by: 12/10/20     Patient will increase functional independence with mobility by performin. Supine to sit with supervision.   2. Sit to supine with supervision.   3. Sit<>stand transfer with supervision using rolling walker.   4. Gait > 150 feet with SBA using rolling walker.   5. Ascend/descend 1 threshold stairs with no handrails with CGA and with AD.                     History:     Past Medical History:   Diagnosis Date    Cancer 2018    lung cancer    Diabetes mellitus     Hypertension     Irregular heart beat     Refusal of blood transfusions as patient is Voodoo        Past Surgical History:   Procedure Laterality Date    APPENDECTOMY       SECTION      x 3    GALLBLADDER SURGERY      JOINT REPLACEMENT      LUNG CANCER SURGERY Right     partial lobectomy    TOTAL KNEE ARTHROPLASTY Left 2020    Procedure: ARTHROPLASTY, KNEE, TOTAL;  Surgeon: Bassam Pardo MD;  Location: [facility];  Service: Orthopedics;  Laterality: Left;  ADDUCTOR BLOCK    TUBAL LIGATION         Time Tracking:     PT Received On: 20  PT Start Time: 1516     PT Stop Time: 1555  PT Total Time (min): 39 min     5 min out of room collecting crackers, water, juice, and emesis bag    Billable Minutes: Evaluation 10, Gait Training 14 and Therapeutic Exercise 10      Cris Danielson, PT  2020

## 2020-12-03 NOTE — PLAN OF CARE
Plan of care reviewed with pt, verbalizes understanding. VSS. Pt AAOX4, respirations even and unlabored. Pt complains of leg pain medicated with PRN pain meds with minimum relief. Vazquez catheter in place draining clear yellow urine. Fall and safety measures maintained this  shift. Bed low and locked call bell and personal items within reach, will continue to monitor.

## 2020-12-03 NOTE — TRANSFER OF CARE
"Anesthesia Transfer of Care Note    Patient: Haylie Jameson    Procedure(s) Performed: Procedure(s) (LRB):  ARTHROPLASTY, KNEE, TOTAL (Right)    Patient location: PACU    Anesthesia Type: spinal    Transport from OR: Transported from OR on room air with adequate spontaneous ventilation    Post pain: adequate analgesia    Post assessment: no apparent anesthetic complications    Post vital signs: stable    Level of consciousness: awake    Nausea/Vomiting: no nausea/vomiting    Complications: none    Transfer of care protocol was followed      Last vitals:   Visit Vitals  BP (!) 187/80 (BP Location: Left arm, Patient Position: Lying)   Pulse 70   Temp 36.2 °C (97.2 °F) (Tympanic)   Resp 16   Ht 5' 6" (1.676 m)   Wt 103 kg (227 lb)   LMP  (LMP Unknown)   SpO2 99%   Breastfeeding No   BMI 36.64 kg/m²     "

## 2020-12-03 NOTE — OP NOTE
DATE OF PROCEDURE: 12/03/2020     CHIEF COMPLAINT AND PRESENT ILLNESS:   65-year-old end-stage arthritis right knee had a left knee replacement few months ago did quite well now requesting the same on the right.  Hopefully same success significant risk discussed especially with the medical history and her size     PREOPERATIVE DIAGNOSIS:  Osteoarthritis right knee     POSTOPERATIVE DIAGNOSIS:   same     PROCEDURES PERFORMED:   right knee replacement Biomet vanguard 62.5/71/10/34    SURGEON:  Bassam Pardo M.D.     ASSISTANT:  Mikaela Elizabeth CST.     COMPLICATIONS:   none     ANESTHESIA:  Spinal     BLOOD LOSS:   72     IMPLANTS:  As above     PROCEDURE IN DETAIL:   patient brought the operating room and spinal anesthesia without difficulty right lower extremities prepped in usual fashion tourniquet applied 3 mm mercury after Esmarch.  Using standard midline incision sharp dissection made down extensor mechanism standard medial parapatellar arthrotomy was performed medial fat pad was removed and a medial release performed in order to gain access to proximal tibia and she had a little bit of varus alignment.  Anterior posterior cruciate ligaments removed mediolateral meniscal remnants removed.  A tibial cut was made perpendicular to the shaft of the tibia sizing was a 71 which matched the other side.  Attention then turned to the femoral side.  Intramedullary guide a 5 degree provisional distal cut was performed.  Sizing was 625 so that matched the other side so with the tensor 1st in flexion anterior-posterior cuts performed the 62.5.  Flexion gap was symmetric at 10.  Attention turned extension gap again with the tensor 10 mm distal cut was performed flexion-extension gaps were symmetric at 10 good alignment good stability chamber cuts performed notch cut was performed with a floating trial 0-120 degrees range of motion really limited because of the size of her thigh.  Rotation was marked tibia was punched  prepatellar thickness 25 post patellar thickness 26 with a 34 patella.  The appropriate components opened using good cement technique all components were cemented excess cement was removed pulse lavage irrigation was used.  Final 10 mm insert placed.  Again excellent range of motion stability.  1.  Vicryl using parapatellar arthrotomy closed in flexion post closure 0-120 degrees range of motion to 0 Vicryl subcutaneously staples on the skin Exparel tranexamic acid were instilled the soft tissues she was placed in bulky sterile dressing tourniquet released 72 min tolerated procedure well brought to come sterile fashion    This performed with a poor recognition system

## 2020-12-03 NOTE — ANESTHESIA PROCEDURE NOTES
Spinal    Diagnosis: R KNEE  Patient location during procedure: holding area  Start time: 12/3/2020 10:59 AM  Timeout: 12/3/2020 10:59 AM  End time: 12/3/2020 11:02 AM    Staffing  Authorizing Provider: Carmelo Collins MD  Performing Provider: Carmelo Collins MD    Preanesthetic Checklist  Completed: patient identified, site marked, surgical consent, pre-op evaluation, timeout performed, IV checked, risks and benefits discussed and monitors and equipment checked  Spinal Block  Patient position: sitting  Prep: ChloraPrep  Patient monitoring: heart rate, cardiac monitor, continuous pulse ox and frequent blood pressure checks  Approach: right paramedian  Location: L3-4  Injection technique: single shot  CSF Fluid: clear free-flowing CSF  Needle  Needle type: pencil-tip   Needle gauge: 25 G  Needle length: 3.5 in  Additional Documentation: incremental injection, negative aspiration for heme and no paresthesia on injection  Needle localization: anatomical landmarks  Assessment  Sensory level: T6   Dermatomal levels determined by alcohol wipe and pinch or prick  Ease of block: easy  Patient's tolerance of the procedure: comfortable throughout block and no complaints

## 2020-12-03 NOTE — ANESTHESIA POSTPROCEDURE EVALUATION
Anesthesia Post Evaluation    Patient: Haylie Jameson    Procedure(s) Performed: Procedure(s) (LRB):  ARTHROPLASTY, KNEE, TOTAL (Right)    Final Anesthesia Type: spinal    Patient location during evaluation: PACU  Patient participation: Yes- Able to Participate  Level of consciousness: awake and alert  Post-procedure vital signs: reviewed and stable  Pain management: adequate  Airway patency: patent  GENARO mitigation strategies: Extubation while patient is awake  PONV status at discharge: No PONV  Anesthetic complications: no      Cardiovascular status: hemodynamically stable  Respiratory status: unassisted  Hydration status: euvolemic  Follow-up not needed.          Vitals Value Taken Time   /77 12/03/20 1340   Temp 36.6 °C (97.9 °F) 12/03/20 1254   Pulse 64 12/03/20 1349   Resp 18 12/03/20 1340   SpO2 100 % 12/03/20 1349   Vitals shown include unvalidated device data.      No case tracking events are documented in the log.      Pain/Sundeep Score: Sundeep Score: 9 (12/3/2020  1:25 PM)

## 2020-12-03 NOTE — CONSULTS
Consult Note  MED    Consult Requested By: Bassam Pardo MD  Reason for Consult: HTN/DM    SUBJECTIVE:     History of Present Illness:  Patient is a 65 y.o. female presents with scheduled right knee surgery.  Seen preop in [facility].  Pre-op/EPIC reviewed.  Discussed with .  No CP/SOB/N/V/D/F/C.  .    Past Medical History:   Diagnosis Date    Cancer 2018    lung cancer    Diabetes mellitus     Hypertension     Irregular heart beat     Refusal of blood transfusions as patient is Buddhist      Past Surgical History:   Procedure Laterality Date    APPENDECTOMY       SECTION      x 3    GALLBLADDER SURGERY      JOINT REPLACEMENT      LUNG CANCER SURGERY Right     partial lobectomy    TOTAL KNEE ARTHROPLASTY Left 2020    Procedure: ARTHROPLASTY, KNEE, TOTAL;  Surgeon: Bassam Pardo MD;  Location: [facility];  Service: Orthopedics;  Laterality: Left;  ADDUCTOR BLOCK    TUBAL LIGATION       History reviewed. No pertinent family history.  Social History     Tobacco Use    Smoking status: Never Smoker    Smokeless tobacco: Never Used   Substance Use Topics    Alcohol use: Never     Frequency: Never    Drug use: Never       Review of patient's allergies indicates:  No Known Allergies     Review of Systems:  Constitutional: No fever or chills  Respiratory: No cough or shortness of breath  Cardiovascular: No chest pain or palpitations  Gastrointestinal: No nausea or vomiting  Neurological: No confusion or weakness    OBJECTIVE:     Vital Signs (Most Recent)  Temp: 97.2 °F (36.2 °C) (20)  Pulse: 70 (20)  Resp: 16 (20)  BP: (!) 187/80 (20)  SpO2: 99 % (20)    Vital Signs Range (Last 24H):  Temp:  [97.2 °F (36.2 °C)]   Pulse:  [70]   Resp:  [16]   BP: (187)/(80)   SpO2:  [99 %]     No intake or output data in the 24 hours ending 20 1001    Physical Exam:  General appearance: Well developed, well nourished  Eyes:   Conjunctivae/corneas clear. PERRL.  Lungs: Normal respiratory effort,   clear to auscultation bilaterally   Heart: Regular rate and rhythm, S1, S2 normal, no murmur, rub or major.  Abdomen: Soft, non-tender non-distended; bowel sounds normal; no masses,  no organomegaly  Extremities: No cyanosis or clubbing. No edema.    Skin: Skin color, texture, turgor normal. No rashes or lesions  Neurologic: Normal strength and tone. No focal numbness or weakness       Laboratory:  No results for input(s): WBC, RBC, HGB, HCT, PLT, MCV, MCH, MCHC in the last 24 hours.  BMP:   Recent Labs   Lab 11/30/20  0925   *      K 4.3      CO2 25   BUN 16   CREATININE 0.8   CALCIUM 9.2     Lab Results   Component Value Date    CALCIUM 9.2 11/30/2020     BNP  No results for input(s): BNP, BNPTRIAGEBLO in the last 168 hours.No results found for: URICACIDNo results found for: IRON, TIBC, FERRITIN, SATURATEDIRO  Lab Results   Component Value Date    CALCIUM 9.2 11/30/2020       Diagnostic Results:  No orders to display       ASSESSMENT/PLAN:     1. Right Knee:  Per ortho/therapy teams.  2. DM:  Resume home meds when eating.  ADA/SSI.  3. HTN:  meds with hold parameters.   4. DVT:  Per ortho.    Will see post op.    Thanks for consult  See above  Will follow along.

## 2020-12-03 NOTE — PLAN OF CARE
Problem: Physical Therapy Goal  Goal: Physical Therapy Goal  Description: Goals to be met by: 12/10/20     Patient will increase functional independence with mobility by performin. Supine to sit with supervision.   2. Sit to supine with supervision.   3. Sit<>stand transfer with supervision using rolling walker.   4. Gait > 150 feet with SBA using rolling walker.   5. Ascend/descend 1 threshold stairs with no handrails with CGA and with AD.    Outcome: Ongoing, Progressing     Patient evaluated by PT and goals established. Patient with severe pain during therapy session, received IV morphine prior to OOB mobility with minimal effect on pain and onset of nausea. AAROM knee flexion ROM 5-80 with max knee flexion achieved with stand>sit. Bed mobility with Abrahan, sit<>stand with Abrahan with RW, and amb x 58 ft with RW and CGA.  PT will continue to follow and progress as tolerated. Rec for d/c to home with HH PT/OT and assistance as needed. Please see progress note for detailed plan of care and recommendations.

## 2020-12-03 NOTE — INTERVAL H&P NOTE
The patient has been examined and the H&P has been reviewed:    I concur with the findings and no changes have occurred since H&P was written.    Surgery risks, benefits and alternative options discussed and understood by patient/family.          Active Hospital Problems    Diagnosis  POA    OA (osteoarthritis) of knee [M17.10]  Yes      Resolved Hospital Problems   No resolved problems to display.

## 2020-12-04 VITALS
DIASTOLIC BLOOD PRESSURE: 65 MMHG | HEIGHT: 66 IN | OXYGEN SATURATION: 95 % | SYSTOLIC BLOOD PRESSURE: 144 MMHG | WEIGHT: 227 LBS | BODY MASS INDEX: 36.48 KG/M2 | HEART RATE: 82 BPM | TEMPERATURE: 99 F | RESPIRATION RATE: 20 BRPM

## 2020-12-04 PROBLEM — M17.9 OA (OSTEOARTHRITIS) OF KNEE: Status: RESOLVED | Noted: 2020-12-03 | Resolved: 2020-12-04

## 2020-12-04 LAB
POCT GLUCOSE: 240 MG/DL (ref 70–110)
POCT GLUCOSE: 244 MG/DL (ref 70–110)
POCT GLUCOSE: 250 MG/DL (ref 70–110)

## 2020-12-04 PROCEDURE — 63600175 PHARM REV CODE 636 W HCPCS: Performed by: NURSE PRACTITIONER

## 2020-12-04 PROCEDURE — 25000003 PHARM REV CODE 250: Performed by: ORTHOPAEDIC SURGERY

## 2020-12-04 PROCEDURE — 97116 GAIT TRAINING THERAPY: CPT

## 2020-12-04 PROCEDURE — 63600175 PHARM REV CODE 636 W HCPCS: Performed by: ORTHOPAEDIC SURGERY

## 2020-12-04 PROCEDURE — 36415 COLL VENOUS BLD VENIPUNCTURE: CPT

## 2020-12-04 PROCEDURE — 97535 SELF CARE MNGMENT TRAINING: CPT

## 2020-12-04 PROCEDURE — 97110 THERAPEUTIC EXERCISES: CPT

## 2020-12-04 PROCEDURE — 97165 OT EVAL LOW COMPLEX 30 MIN: CPT

## 2020-12-04 RX ORDER — PROMETHAZINE HYDROCHLORIDE 25 MG/1
25 TABLET ORAL EVERY 6 HOURS PRN
Qty: 25 TABLET | Refills: 0 | Status: SHIPPED | OUTPATIENT
Start: 2020-12-04

## 2020-12-04 RX ORDER — IBUPROFEN 200 MG
16 TABLET ORAL
Status: DISCONTINUED | OUTPATIENT
Start: 2020-12-04 | End: 2020-12-04 | Stop reason: HOSPADM

## 2020-12-04 RX ORDER — HYDROCODONE BITARTRATE AND ACETAMINOPHEN 10; 325 MG/1; MG/1
1 TABLET ORAL EVERY 6 HOURS PRN
Qty: 50 TABLET | Refills: 0 | Status: SHIPPED | OUTPATIENT
Start: 2020-12-04

## 2020-12-04 RX ORDER — INSULIN ASPART 100 [IU]/ML
0-5 INJECTION, SOLUTION INTRAVENOUS; SUBCUTANEOUS
Status: DISCONTINUED | OUTPATIENT
Start: 2020-12-04 | End: 2020-12-04 | Stop reason: HOSPADM

## 2020-12-04 RX ORDER — GLUCAGON 1 MG
1 KIT INJECTION
Status: DISCONTINUED | OUTPATIENT
Start: 2020-12-04 | End: 2020-12-04 | Stop reason: HOSPADM

## 2020-12-04 RX ORDER — NAPROXEN SODIUM 220 MG/1
81 TABLET, FILM COATED ORAL 2 TIMES DAILY
Refills: 0
Start: 2020-12-04 | End: 2021-12-04

## 2020-12-04 RX ORDER — IBUPROFEN 200 MG
24 TABLET ORAL
Status: DISCONTINUED | OUTPATIENT
Start: 2020-12-04 | End: 2020-12-04 | Stop reason: HOSPADM

## 2020-12-04 RX ORDER — METFORMIN HYDROCHLORIDE 500 MG/1
500 TABLET, EXTENDED RELEASE ORAL 2 TIMES DAILY WITH MEALS
Qty: 60 TABLET | Refills: 0 | Status: SHIPPED | OUTPATIENT
Start: 2020-12-04

## 2020-12-04 RX ORDER — FAMOTIDINE 20 MG/1
20 TABLET, FILM COATED ORAL DAILY
Status: DISCONTINUED | OUTPATIENT
Start: 2020-12-04 | End: 2020-12-04 | Stop reason: HOSPADM

## 2020-12-04 RX ADMIN — FAMOTIDINE 20 MG: 20 TABLET ORAL at 08:12

## 2020-12-04 RX ADMIN — ONDANSETRON 8 MG: 8 TABLET, ORALLY DISINTEGRATING ORAL at 08:12

## 2020-12-04 RX ADMIN — MUPIROCIN 1 G: 20 OINTMENT TOPICAL at 08:12

## 2020-12-04 RX ADMIN — INSULIN ASPART 2 UNITS: 100 INJECTION, SOLUTION INTRAVENOUS; SUBCUTANEOUS at 11:12

## 2020-12-04 RX ADMIN — HYDROCODONE BITARTRATE AND ACETAMINOPHEN 1 TABLET: 10; 325 TABLET ORAL at 09:12

## 2020-12-04 RX ADMIN — RANOLAZINE 500 MG: 500 TABLET, FILM COATED, EXTENDED RELEASE ORAL at 08:12

## 2020-12-04 RX ADMIN — INSULIN ASPART 2 UNITS: 100 INJECTION, SOLUTION INTRAVENOUS; SUBCUTANEOUS at 08:12

## 2020-12-04 RX ADMIN — DEXTROSE 2 G: 50 INJECTION, SOLUTION INTRAVENOUS at 04:12

## 2020-12-04 RX ADMIN — DILTIAZEM HYDROCHLORIDE 180 MG: 180 CAPSULE, COATED, EXTENDED RELEASE ORAL at 08:12

## 2020-12-04 RX ADMIN — LISINOPRIL 5 MG: 5 TABLET ORAL at 08:12

## 2020-12-04 RX ADMIN — NEBIVOLOL HYDROCHLORIDE 20 MG: 10 TABLET ORAL at 08:12

## 2020-12-04 RX ADMIN — POLYETHYLENE GLYCOL 3350 17 G: 17 POWDER, FOR SOLUTION ORAL at 08:12

## 2020-12-04 RX ADMIN — ASPIRIN 81 MG: 81 TABLET, CHEWABLE ORAL at 08:12

## 2020-12-04 RX ADMIN — METFORMIN HYDROCHLORIDE 500 MG: 500 TABLET, EXTENDED RELEASE ORAL at 08:12

## 2020-12-04 RX ADMIN — HYDROCODONE BITARTRATE AND ACETAMINOPHEN 1 TABLET: 10; 325 TABLET ORAL at 04:12

## 2020-12-04 RX ADMIN — HYDROCODONE BITARTRATE AND ACETAMINOPHEN 1 TABLET: 10; 325 TABLET ORAL at 12:12

## 2020-12-04 RX ADMIN — GLIMEPIRIDE 2 MG: 2 TABLET ORAL at 08:12

## 2020-12-04 NOTE — PLAN OF CARE
Problem: Adult Inpatient Plan of Care  Goal: Plan of Care Review  Outcome: Ongoing, Progressing     Problem: Fall Injury Risk  Goal: Absence of Fall and Fall-Related Injury  Outcome: Ongoing, Progressing     Problem: Pain Acute  Goal: Optimal Pain Control  Outcome: Ongoing, Progressing     Problem: Infection  Goal: Infection Symptom Resolution  Outcome: Ongoing, Progressing     Problem: Adult Inpatient Plan of Care  Goal: Patient-Specific Goal (Individualization)  Outcome: Ongoing, Progressing     Problem: Adult Inpatient Plan of Care  Goal: Absence of Hospital-Acquired Illness or Injury  Outcome: Ongoing, Progressing     Problem: Adult Inpatient Plan of Care  Goal: Optimal Comfort and Wellbeing  Outcome: Ongoing, Progressing     Problem: Adult Inpatient Plan of Care  Goal: Readiness for Transition of Care  Outcome: Ongoing, Progressing     Problem: Adult Inpatient Plan of Care  Goal: Rounds/Family Conference  Outcome: Ongoing, Progressing

## 2020-12-04 NOTE — PROGRESS NOTES
"MED  Progress Note    Admit Date: 12/3/2020   LOS: 0 days     SUBJECTIVE:     Follow-up For:  OA (osteoarthritis) of knee    Interval History:     Issues w/ pain control but no CP/SOB/CALF PAIN.  Mild PONV this am.  Walking better with therapy this am.      Review of Systems:  Constitutional: No fever or chills  Respiratory: No cough or shortness of breath  Cardiovascular: No chest pain or palpitations  Gastrointestinal: SONNY  Neurological: No confusion or weakness    OBJECTIVE:     Vital Signs Range (Last 24H):  BP (!) 186/78 (BP Location: Left arm, Patient Position: Lying)   Pulse 100   Temp 98.6 °F (37 °C) (Oral)   Resp 20   Ht 5' 6" (1.676 m)   Wt 103 kg (227 lb)   LMP  (LMP Unknown) Comment: post menopausal  SpO2 (!) 94%   Breastfeeding No   BMI 36.64 kg/m²     Temp:  [97.9 °F (36.6 °C)-99.4 °F (37.4 °C)]   Pulse:  []   Resp:  [18-20]   BP: (141-189)/(65-98)   SpO2:  [94 %-100 %]     I & O (Last 24H):    Intake/Output Summary (Last 24 hours) at 12/4/2020 0915  Last data filed at 12/4/2020 0500  Gross per 24 hour   Intake 3345 ml   Output 3800 ml   Net -455 ml       Physical Exam:  General appearance: Well developed, well nourished  Eyes:  Conjunctivae/corneas clear. PERRL.  Lungs: Normal respiratory effort,   clear to auscultation bilaterally   Heart: Regular rate and rhythm, S1, S2 normal, no murmur, rub or major.  Abdomen: Soft, non-tender non-distended; bowel sounds normal; no masses,  no organomegaly, obese  Extremities: No cyanosis or clubbing. 1+ chronic edema.    Skin: Skin color, texture, turgor normal. No rashes or lesions  Neurologic: Normal strength and tone. No focal numbness or weakness   Right knee CDI      Laboratory Data:  No results for input(s): WBC, RBC, HGB, HCT, PLT, MCV, MCH, MCHC in the last 24 hours.    BMP:   Recent Labs   Lab 11/30/20  0925   *      K 4.3      CO2 25   BUN 16   CREATININE 0.8   CALCIUM 9.2     Lab Results   Component Value Date    " CALCIUM 9.2 11/30/2020       Lab Results   Component Value Date    CALCIUM 9.2 11/30/2020       No results found for: URICACID    BNP  No results for input(s): BNP, BNPTRIAGEBLO in the last 168 hours.    Medications:  Medication list was reviewed and changes noted under Assessment/Plan.    Diagnostic Results:        ASSESSMENT/PLAN:     1. Right Knee:  Per ortho/therapy teams.  2. DM:  Resumed home meds when eating.  ADA/SSI.  Slight hyperglycemia from D5 infusion.  DC'd.    3. Mild PONV:  Better after zofran.  4. HTN:  meds with hold parameters.   5. DVT:  Per ortho.      Stable from medicine.

## 2020-12-04 NOTE — PLAN OF CARE
LMSW met with the patient at the bedside.     Patient is alert and oriented with no communication barriers.     Patient has a RW and BSC in the home. Patient declined needing a tub device. Patient is agreeable to HH. LMSW talked to the patient about freedom of choice. Patient is agreeable to [facility] as she has had them in the past. LMSW sent HH orders to [facility].     Patients PCP is correct on the face sheet. Patient choice pharmacy is bedside delivery.     Patients family will transport the patient home at discharge.     CM team will continue to follow.            12/04/20 1049   Discharge Assessment   Assessment Type Discharge Planning Assessment   Confirmed/corrected address and phone number on facesheet? Yes   Assessment information obtained from? Patient   Communicated expected length of stay with patient/caregiver no   Prior to hospitilization cognitive status: Alert/Oriented   Prior to hospitalization functional status: Assistive Equipment   Current cognitive status: Alert/Oriented   Current Functional Status: Assistive Equipment   Lives With spouse   Able to Return to Prior Arrangements yes   Is patient able to care for self after discharge? Yes   Patient's perception of discharge disposition home health   Readmission Within the Last 30 Days no previous admission in last 30 days   Patient currently being followed by outpatient case management? No   Patient currently receives any other outside agency services? No   Equipment Currently Used at Home walker, rolling;bedside commode   Do you have any problems affording any of your prescribed medications? No   Is the patient taking medications as prescribed? yes   Does the patient have transportation home? Yes   Transportation Anticipated family or friend will provide   Does the patient receive services at the [facility]? No   Discharge Plan A Home Health   DME Needed Upon Discharge  none   Patient/Family in Agreement with Plan yes

## 2020-12-04 NOTE — PT/OT/SLP EVAL
Occupational Therapy   Evaluation    Name: Haylie Jameson  MRN: [id number]  Admitting Diagnosis:  OA (osteoarthritis) of knee 1 Day Post-Op    Recommendations:     Discharge Recommendations: home, home health OT, home health PT  Discharge Equipment Recommendations:  bath bench  Barriers to discharge:       Assessment:     Haylie Jameson is a 65 y.o. female with a medical diagnosis of OA (osteoarthritis) of knee.  She presents agreeable to therapy, spouse present throughout session. Performance deficits affecting function: weakness, impaired endurance, impaired self care skills, impaired functional mobilty, gait instability, decreased lower extremity function, pain, orthopedic precautions.      OT eval completed with appropriate goals & POC established. Pt requiring Mod A-Supervision for all ADLS & mobility. Recommend home with HH & spouse assist as needed    Rehab Prognosis: Good; patient would benefit from acute skilled OT services to address these deficits and reach maximum level of function.       Plan:     Patient to be seen 6 x/week to address the above listed problems via self-care/home management, therapeutic activities, therapeutic exercises  · Plan of Care Expires: 01/03/21  · Plan of Care Reviewed with: patient, spouse    Subjective     Chief Complaint: pain   Patient/Family Comments/goals: to get home, Im gonna do better when I get home     Occupational Profile:  Living Environment:  Pt lives in single story home with spouse   Previous level of function:  Mod I ADLS & mobility, previous L TKR in August 2020   Roles and Routines: cares for self, home mgmt   Equipment Used at Home:  bedside commode, walker, rolling  Assistance upon Discharge: spouse     Pain/Comfort:  · Pain Rating 1: 8/10  · Location - Orientation 1: generalized  · Location 1: knee  · Pain Addressed 1: Pre-medicate for activity, Reposition, Distraction, Cessation of Activity  · Pain Rating Post-Intervention 1: 5/10    Patients  cultural, spiritual, Anglican conflicts given the current situation: no    Objective:     Communicated with: SUSIE Metz prior to session.  Patient found up in chair with peripheral IV upon OT entry to room.    General Precautions: Standard, fall   Orthopedic Precautions:RLE weight bearing as tolerated   Braces: N/A     Occupational Performance:    Bed Mobility:    · DNT    Functional Mobility/Transfers:  · Patient completed Sit <> Stand Transfer with stand by assistance  with  rolling walker   · Patient completed Toilet Transfer Step Transfer technique with stand by assistance with  rolling walker  · Functional Mobility: SBA RW within room chair>BSC>sink>chair    Activities of Daily Living:  · Feeding:  independence seated in chair   · Grooming: supervision hand hygiene standing at sink  · Upper Body Dressing: setup don pullover dress  seated  · Lower Body Dressing: maximal assistance doff socks & don house shoes with spouse assist per pt request, spouse reports able to provide current level of assist upon return home     Cognitive/Visual Perceptual:  AAOx4    Physical Exam:  Upper Extremity Range of Motion:     -       Right Upper Extremity: WFL  -       Left Upper Extremity: WFL  Upper Extremity Strength:    -       Right Upper Extremity: WFL  -       Left Upper Extremity: WFL    AMPAC 6 Click ADL:  AMPAC Total Score: 20    Treatment & Education:  Pt participated in OT  session with ADLS & functional mobility performed as documented above.    Education provided on role of OT including safe performance of self-care tasks, mobility techniques, safe use of DME, and encouragement of mobilization during hospitalization to prevent/limit deconditioning as well as discharge recommendations   Education:    Patient left up in chair with all lines intact, call button in reach, RN notified and spouse present    GOALS:   Multidisciplinary Problems     Occupational Therapy Goals        Problem: Occupational Therapy Goal    Goal  Priority Disciplines Outcome Interventions   Occupational Therapy Goal     OT, PT/OT Ongoing, Progressing    Description: Goals to be met by: 2020     Patient will increase functional independence with ADLs by performing:    LE Dressing with Modified [place].  Grooming while standing with Modified [place].  Toileting from bedside commode with Modified [place] for hygiene and clothing management.   Supine to sit with Modified [place].  Toilet transfer to bedside commode with Modified [place].                     History:     Past Medical History:   Diagnosis Date    Cancer 2018    lung cancer    Diabetes mellitus     Hypertension     Irregular heart beat     Refusal of blood transfusions as patient is [place]        Past Surgical History:   Procedure Laterality Date    APPENDECTOMY       SECTION      x 3    GALLBLADDER SURGERY      JOINT REPLACEMENT      LUNG CANCER SURGERY Right     partial lobectomy    TOTAL KNEE ARTHROPLASTY Left 2020    Procedure: ARTHROPLASTY, KNEE, TOTAL;  Surgeon: Bassam Pardo MD;  [facility];  Service: Orthopedics;  Laterality: Left;  ADDUCTOR BLOCK    TUBAL LIGATION         Time Tracking:     OT Date of Treatment: 20  OT Start Time: 934  OT Stop Time: 959  OT Total Time (min): 25 min    Billable Minutes:Evaluation 15  Self Care/Home Management 10    Arianna Abudllahi OT  2020

## 2020-12-04 NOTE — PT/OT/SLP PROGRESS
Physical Therapy Treatment    Patient Name:  Haylie Jameson   MRN:  [id number]    Recommendations:     Discharge Recommendations:  home, home health PT, home health OT   Discharge Equipment Recommendations: none(Has equipment from previous TKA)   Barriers to discharge: None    Assessment:     Haylie Jameson is a 65 y.o. female admitted with a medical diagnosis of OA (osteoarthritis) of knee.  She presents with the following impairments/functional limitations:  weakness, impaired endurance, impaired self care skills, impaired functional mobilty, gait instability, impaired balance, decreased lower extremity function, pain, decreased ROM, edema.    Pt tolerated treatment well with no adverse reactions. Pt performed transfers and ambulation x 400 ft with rolling walker and SBA. Right knee flexion/extension AROM = 12 deg (lacking full extension) to 55 deg. Demonstrated improved endurance and ambulation. Pt continues to be limited by pain, nausea and decreased knee ROM. Pt performed HEP with verbal and tactile cues for exercises/technique. Educated patient on TKA precautions, safe use of rolling walker, home exercise program, positioning after knee replacement, importance of mobility, and PT plan of care. Pt verbalized understanding. Pt will benefit from skilled PT services to address impairments and functional limitations.    Rehab Prognosis: Good; patient would benefit from acute skilled PT services to address these deficits and reach maximum level of function.    Recent Surgery: Procedure(s) (LRB):  ARTHROPLASTY, KNEE, TOTAL (Right) 1 Day Post-Op    Plan:     During this hospitalization, patient to be seen (QD-BID) to address the identified rehab impairments via gait training, therapeutic activities, therapeutic exercises and progress toward the following goals:    · Plan of Care Expires:  12/10/20    Subjective     Chief Complaint: Pain in R knee.   Patient/Family Comments/goals: Pt states she is very  "nauseous.    Pain/Comfort:  · Pain Rating 1: 7/10  · Location - Side 1: Right  · Location - Orientation 1: transverse  · Location 1: knee  · Pain Addressed 1: Reposition, Distraction, Cessation of Activity  · Pain Rating Post-Intervention 1: (Pt reports pain improved. No rating given.)      Objective:     Communicated with RN (Lashay) prior to session.  Patient found ambulating with PCT with peripheral IV upon PT entry to room.     General Precautions: Standard, diabetic, fall   Orthopedic Precautions:RLE weight bearing as tolerated   Braces: N/A     Functional Mobility: Gait belt and non-slip socks donned prior to mobility.Surgical mask donned for ambulation in hallway per current infection control policy.  · Transfers:     · Sit to Stand:  contact guard assistance with rolling walker  · Pt with good eccentric lowering.  · Pt observed to move surgical foot anteriorly when sitting to decreased knee flexion.  · Gait: Pt ambulated 400ft w/ RW and SBA.   · Pt with improved endurance and ambulation as compared to last session.   · Pt demonstrated forward trunk flexion, decreased surgical knee ROM, decreased bilateral heel strike/toe off, increased double limb support time, and decreased surgical knee flexion during swing phase.  · Pt given verbal cues for safe use of rolling walker.   · Stairs:  Pt ascended/descended 4" curb step with Rolling Walker with no handrails with Contact Guard Assistance.       AM-PAC 6 CLICK MOBILITY  Turning over in bed (including adjusting bedclothes, sheets and blankets)?: 3  Sitting down on and standing up from a chair with arms (e.g., wheelchair, bedside commode, etc.): 3  Moving from lying on back to sitting on the side of the bed?: 3  Moving to and from a bed to a chair (including a wheelchair)?: 3  Need to walk in hospital room?: 3  Climbing 3-5 steps with a railing?: 3  Basic Mobility Total Score: 18       Therapeutic Activities and Exercises:   Patient performed seated and reclined " "ther ex including ankle pumps x10, SLR x10, ABD/ADD x10, and heel slides x10. Pt required Total A for performance of SLR and Max A for ABD/ADD x10. When asked to lift her surgical LE, pt states "I can't." Pt performed 2 heel slides w/ AAROM with help of therapist. Pt performed remaining heel slides with AAROM with use of bed sheet placed around heel to increase knee flexion. Pt demonstrated improvement with use of bed sheet. Pt required verbal and tactile cues for correct exercise performance/technique.     PT educated patient re:   · PT plan of care/role of PT  · Use of rolling walker  · Importance of mobility   · Fall and safety precautions  · Gait deviations  · Use of call light (don't get up without assistance)  Pt verbalized understanding     The patient is safe to transfer with RN assist.  Patient encouraged to sit up in chair throughout the day to prevent deconditioning.       Patient left up in chair with call button in reach..    GOALS:   Multidisciplinary Problems     Physical Therapy Goals        Problem: Physical Therapy Goal    Goal Priority Disciplines Outcome Goal Variances Interventions   Physical Therapy Goal     PT, PT/OT Ongoing, Progressing     Description: Goals to be met by: 12/10/20     Patient will increase functional independence with mobility by performin. Supine to sit with supervision.   2. Sit to supine with supervision.   3. Sit<>stand transfer with supervision using rolling walker.   4. Gait > 150 feet with SBA using rolling walker. GOAL MET 2020  5. Ascend/descend 1 threshold stairs with no handrails with CGA and with AD. GOAL MET 2020                       Time Tracking:     PT Received On: 20  PT Start Time: 822     PT Stop Time: 901  PT Total Time (min): 39 min     Billable Minutes: Gait Training 29 and Therapeutic Exercise 10    Treatment Type: Treatment  PT/PTA: PT     PTA Visit Number: 0     JONH Gavin  2020  "

## 2020-12-04 NOTE — PLAN OF CARE
Problem: Occupational Therapy Goal  Goal: Occupational Therapy Goal  Description: Goals to be met by: 12/11/2020     Patient will increase functional independence with ADLs by performing:    LE Dressing with Modified [place].  Grooming while standing with Modified [place].  Toileting from bedside commode with Modified [place] for hygiene and clothing management.   Supine to sit with Modified [place].  Toilet transfer to bedside commode with Modified [place].    Outcome: Ongoing, Progressing   OT eval completed with appropriate goals & POC established. Pt requiring Mod A-Supervision for all ADLS & mobility. Recommend home with HH & spouse assist as needed.

## 2020-12-04 NOTE — DISCHARGE SUMMARY
[facility]  Discharge Summary      Admit Date: 12/3/2020    Discharge Date and Time: No discharge date for patient encounter.    Attending Physician: Bassam Pardo MD     Reason for Admission:  Osteoarthritis knee    Procedures Performed: Procedure(s) (LRB):  ARTHROPLASTY, KNEE, TOTAL (Right)    Hospital Course (synopsis of major diagnoses, care, treatment, and services provided during the course of the hospital stay): The above patient underwent the above procedure.  Post operative the patient received pain management and pt / ot. The patient progressed well and will be discharged--see orders.     Consults: nephrology    Significant Diagnostic Studies: Labs: All labs within the past 24 hours have been reviewed    Final Diagnoses:    Principal Problem: OA (osteoarthritis) of knee   Secondary Diagnoses:   Active Hospital Problems   No active problems to display.      Resolved Hospital Problems    Diagnosis Date Resolved POA    *OA (osteoarthritis) of knee [M17.10] 12/04/2020 Yes    OA (osteoarthritis) of knee [M17.10] 12/03/2020 Yes       Discharged Condition: good    Disposition: Home-Health Care [facility]    Follow Up/Patient Instructions:     Medications:  Reconciled Home Medications:      Medication List      CHANGE how you take these medications    * aspirin 81 MG Chew  Take 1 tablet (81 mg total) by mouth 2 (two) times daily.  What changed: Another medication with the same name was added. Make sure you understand how and when to take each.     * aspirin 81 MG Chew  Take 1 tablet (81 mg total) by mouth 2 (two) times daily.  What changed: You were already taking a medication with the same name, and this prescription was added. Make sure you understand how and when to take each.     * HYDROcodone-acetaminophen 7.5-325 mg per tablet  Commonly known as: NORCO  Take 1 tablet by mouth every 6 (six) hours as needed for Pain.  What changed: Another medication with the same name was added. Make sure  you understand how and when to take each.     * HYDROcodone-acetaminophen  mg per tablet  Commonly known as: NORCO  Take 1 tablet by mouth every 6 (six) hours as needed.  What changed: You were already taking a medication with the same name, and this prescription was added. Make sure you understand how and when to take each.     * promethazine 25 MG tablet  Commonly known as: PHENERGAN  Take 25 mg by mouth as needed for Nausea.  What changed: Another medication with the same name was added. Make sure you understand how and when to take each.     * promethazine 25 MG tablet  Commonly known as: PHENERGAN  Take 1 tablet (25 mg total) by mouth every 6 (six) hours as needed.  What changed: You were already taking a medication with the same name, and this prescription was added. Make sure you understand how and when to take each.         * This list has 6 medication(s) that are the same as other medications prescribed for you. Read the directions carefully, and ask your doctor or other care provider to review them with you.            CONTINUE taking these medications    diltiaZEM 180 MG Cs24  Commonly known as: TIAZAC  Take 180 mg by mouth once daily.     glimepiride 2 MG tablet  Commonly known as: AMARYL  Take 2 mg by mouth 2 (two) times a day.     lisinopriL 5 MG tablet  Commonly known as: PRINIVIL,ZESTRIL  Take 5 mg by mouth once daily.     metFORMIN 500 mg 24hr tablet  Commonly known as: FORTAMET  Take 1 tablet (500 mg total) by mouth 2 (two) times daily with meals.     nebivoloL 10 MG Tab  Commonly known as: BYSTOLIC  Take 20 mg by mouth once daily.     ranolazine 500 MG Tb12  Commonly known as: RANEXA  Take 500 mg by mouth 2 (two) times daily.     simvastatin 40 MG tablet  Commonly known as: ZOCOR  Take 40 mg by mouth every evening.          Discharge Procedure Orders   COVID-19 Routine Screening   Standing Status: Future Number of Occurrences: 1 Standing Exp. Date: 01/15/22     Order Specific Question Answer  Comments   Is the patient symptomatic? No    Is this needed for pre-procedure or pre-op testing? Yes    Diagnosis: Preop testing [051852]      Follow-up Information     Enio Sandoval.    Contact information:  [facility] [address] [address] [phone]           Please follow up.    Why: AS SCHEDULED , Call [phone] to reach Dr. Pardo

## 2020-12-04 NOTE — PLAN OF CARE
ANATOLIYSW [facility] taken over the [place] office that services [place]. Patient is accepted. AVS updated.     CM needs addressed for discharge.     Patients  is at the bedside for transport.      12/04/20 1136   Final Note   Assessment Type Final Discharge Note   Anticipated Discharge Disposition Home-Health   What phone number can be called within the next 1-3 days to see how you are doing after discharge? [phone]

## 2020-12-04 NOTE — PLAN OF CARE
Problem: Physical Therapy Goal  Goal: Physical Therapy Goal  Description: Goals to be met by: 12/10/20     Patient will increase functional independence with mobility by performin. Supine to sit with supervision.   2. Sit to supine with supervision.   3. Sit<>stand transfer with supervision using rolling walker.   4. Gait > 150 feet with SBA using rolling walker. GOAL MET 2020  5. Ascend/descend 1 threshold stairs with no handrails with CGA and with AD. GOAL MET 2020    Outcome: Ongoing, Progressing    Pt tolerated treatment well with no adverse reactions. Pt performed transfers and ambulation x 400 ft with rolling walker and SBA. Right knee flexion/extension AROM = 12 deg (lacking full extension) to 55 deg. Demonstrated improved endurance and ambulation. Pt continues to be limited by pain, nausea and decreased knee ROM. Pt performed HEP with verbal and tactile cues for exercises/technique. Educated patient on TKA precautions, safe use of rolling walker, home exercise program, positioning after knee replacement, importance of mobility, and PT plan of care. Pt verbalized understanding. Pt will benefit from skilled PT services to address impairments and functional limitations.

## 2020-12-14 ENCOUNTER — EXTERNAL HOME HEALTH (OUTPATIENT)
Dept: HOME HEALTH SERVICES | Facility: HOSPITAL | Age: 65
End: 2020-12-14

## 2021-05-31 ENCOUNTER — OFFICE VISIT (OUTPATIENT)
Dept: DERMATOLOGY | Facility: CLINIC | Age: 66
End: 2021-05-31
Payer: COMMERCIAL

## 2021-05-31 DIAGNOSIS — L81.9 HYPOPIGMENTATION: ICD-10-CM

## 2021-05-31 DIAGNOSIS — L21.9 SEBORRHEIC DERMATITIS: Primary | ICD-10-CM

## 2021-05-31 PROCEDURE — 99999 PR PBB SHADOW E&M-EST. PATIENT-LVL III: CPT | Mod: PBBFAC,,, | Performed by: DERMATOLOGY

## 2021-05-31 PROCEDURE — 99203 PR OFFICE/OUTPT VISIT, NEW, LEVL III, 30-44 MIN: ICD-10-PCS | Mod: S$GLB,,, | Performed by: DERMATOLOGY

## 2021-05-31 PROCEDURE — 99999 PR PBB SHADOW E&M-EST. PATIENT-LVL III: ICD-10-PCS | Mod: PBBFAC,,, | Performed by: DERMATOLOGY

## 2021-05-31 PROCEDURE — 99203 OFFICE O/P NEW LOW 30 MIN: CPT | Mod: S$GLB,,, | Performed by: DERMATOLOGY

## 2021-05-31 PROCEDURE — 99213 OFFICE O/P EST LOW 20 MIN: CPT | Mod: PBBFAC,PO | Performed by: DERMATOLOGY

## 2021-05-31 RX ORDER — KETOCONAZOLE 20 MG/G
CREAM TOPICAL
Qty: 60 G | Refills: 3 | Status: SHIPPED | OUTPATIENT
Start: 2021-05-31

## 2021-05-31 RX ORDER — KETOCONAZOLE 20 MG/ML
SHAMPOO, SUSPENSION TOPICAL
Qty: 240 ML | Refills: 5 | Status: SHIPPED | OUTPATIENT
Start: 2021-05-31

## 2021-05-31 RX ORDER — TRIAMCINOLONE ACETONIDE 0.25 MG/G
CREAM TOPICAL
Qty: 80 G | Refills: 1 | Status: SHIPPED | OUTPATIENT
Start: 2021-05-31 | End: 2022-04-13

## 2023-05-12 NOTE — PROGRESS NOTES
"MED  Progress Note    Admit Date: 8/27/2020   LOS: 0 days     SUBJECTIVE:     Follow-up For:  OA    Interval History:     Uneventful night.  No CP/SOB/Calf pain.      Review of Systems:  Constitutional: No fever or chills  Respiratory: No cough or shortness of breath  Cardiovascular: No chest pain or palpitations  Gastrointestinal: No nausea or vomiting  Neurological: No confusion or weakness    OBJECTIVE:     Vital Signs Range (Last 24H):  BP (!) 148/74 (BP Location: Left arm, Patient Position: Lying)   Pulse 80   Temp 98.6 °F (37 °C) (Oral)   Resp 18   Ht 5' 6" (1.676 m)   Wt 96.2 kg (212 lb 0.1 oz)   SpO2 95%   Breastfeeding No   BMI 34.22 kg/m²     Temp:  [97.3 °F (36.3 °C)-98.7 °F (37.1 °C)]   Pulse:  [64-98]   Resp:  [14-20]   BP: (128-186)/(60-78)   SpO2:  [93 %-100 %]     I & O (Last 24H):    Intake/Output Summary (Last 24 hours) at 8/28/2020 0932  Last data filed at 8/28/2020 0712  Gross per 24 hour   Intake 850 ml   Output 2750 ml   Net -1900 ml       Physical Exam:  General appearance: Well developed, well nourished  Eyes:  Conjunctivae/corneas clear. PERRL.  Lungs: Normal respiratory effort,   clear to auscultation bilaterally   Heart: Regular rate and rhythm, S1, S2 normal, no murmur, rub or major.  Abdomen: Soft, non-tender non-distended; bowel sounds normal; no masses,  no organomegaly  Extremities: No cyanosis or clubbing. 2+ chronic edema.    Skin: Skin color, texture, turgor normal. No rashes or lesions  Neurologic: Normal strength and tone. No focal numbness or weakness   Left knee CDI    Laboratory Data:  Recent Labs   Lab 08/28/20  0341   WBC 13.54*   RBC 4.21   HGB 11.8*   HCT 36.3*      MCV 86   MCH 28.0   MCHC 32.5       BMP:   Recent Labs   Lab 08/28/20  0341   *   *   K 4.4      CO2 21*   BUN 9   CREATININE 0.8   CALCIUM 8.4*     Lab Results   Component Value Date    CALCIUM 8.4 (L) 08/28/2020       Lab Results   Component Value Date    CALCIUM 8.4 (L) " Subjective:       Patient ID: Marva Eugene is a 48 y.o. female.    Chief Complaint: Follow-up (6 week follow up)    Established patient follows up for management of chronic medical illnesses with complaints today. Please see dictation and ROS for interval problems, specific complaints and disease management discussion.    Past, Surgical, Family, Social, Histories; Medications, allergies reviewed and reconciled.  Health maintenance file reviewed and addressed items due. Recent applicable lab, imaging and cardiovascular results reviewed.  Problem list items reviewed and modified or added entries (in the overview section) may not be transcribed into this encounter note due to note writer format.      Follows up for blood pressure check today.  Blood pressure is good.  We did an EKG when she presented yesterday that had nonspecific changes, prompting a stress echocardiogram that was nonischemic.  She has no cardiovascular symptoms such as chest pain, dyspnea, syncope.  She had a concern over the reading of mild aortic regurgitation.  I explained those are typically incidental findings that do not progressed.  We agree to query the reading cardiologist about any future necessity for follow-up.  I do recommend diet and exercise on a regular basis.  She is somewhat limited by previous knee surgery on the left.    Undertook a more extensive chart review today, including outside records.  She had an unspecified small intestinal tumor treated at [facility] years ago.  States she was told it was completely taken care of.  By history had an open laparotomy.  Has had colonoscopies, states the last was approximately 6 years old, no records are available.  Also noted an outside record with a gallbladder polyp and hepatomegaly.      Review of Systems   Constitutional:  Negative for appetite change, chills, diaphoresis, fatigue and fever.   HENT:  Negative for congestion, postnasal drip, rhinorrhea, sore throat  08/28/2020       No results found for: URICACID    BNP  No results for input(s): BNP, BNPTRIAGEBLO in the last 168 hours.    Medications:  Medication list was reviewed and changes noted under Assessment/Plan.    Diagnostic Results:        ASSESSMENT/PLAN:     1. S/P Left Knee (M17.11):  Per ortho/therapy teams.  2. HTN (I10):  meds with hold parameters.  3. DM (E11.65):  Oral meds, ADA, SSI.   4. 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  Will monitor. Stable.   5. CAD (I25.10):  Stress test noted.  Placed on [facility].  Cleared by Cards in [place].  Placed on tele.   6. DVT prophy:  Per ortho.   ASA BID       Ok to DC                and trouble swallowing.    Eyes:  Negative for visual disturbance.   Respiratory:  Negative for cough, choking, chest tightness, shortness of breath and wheezing.    Cardiovascular:  Negative for chest pain and leg swelling.   Gastrointestinal:  Negative for abdominal distention, abdominal pain, diarrhea, nausea and vomiting.   Genitourinary:  Negative for difficulty urinating and hematuria.   Musculoskeletal:  Positive for arthralgias and gait problem. Negative for myalgias.   Skin:  Negative for rash.   Neurological:  Negative for weakness, light-headedness and headaches.   Hematological:  Does not bruise/bleed easily.   Psychiatric/Behavioral:  Negative for decreased concentration and dysphoric mood.      Objective:      Physical Exam  Vitals and nursing note reviewed.   Constitutional:       Appearance: She is well-developed. She is not diaphoretic.   HENT:      Head: Normocephalic and atraumatic.   Eyes:      General: No scleral icterus.     Conjunctiva/sclera: Conjunctivae normal.   Cardiovascular:      Rate and Rhythm: Normal rate.      Heart sounds: Normal heart sounds. No murmur heard.    No friction rub. No gallop.   Pulmonary:      Effort: Pulmonary effort is normal. No respiratory distress.      Breath sounds: Normal breath sounds. No wheezing or rales.   Abdominal:      General: There is no distension or abdominal bruit.      Tenderness: There is no abdominal tenderness.   Musculoskeletal:         General: No deformity.      Cervical back: Normal range of motion and neck supple.   Skin:     General: Skin is warm and dry.      Findings: No erythema or rash.   Neurological:      Mental Status: She is alert and oriented to person, place, and time.      Cranial Nerves: No cranial nerve deficit.      Motor: No tremor.      Coordination: Coordination normal.      Gait: Gait normal.   Psychiatric:         Behavior: Behavior normal.         Thought Content: Thought content normal.         Judgment: Judgment  normal.       Assessment:       1. Elevated blood pressure reading    2. Abnormal EKG    3. Displaced comminuted fracture of left patella, initial encounter for closed fracture    4. Colon cancer screening    5. Malignant neoplasm of small intestine, unspecified    6. Gallbladder polyp        Plan:     Medication List with Changes/Refills   Current Medications    ACETAMINOPHEN (TYLENOL) 500 MG TABLET    Take 1 tablet (500 mg total) by mouth every 6 (six) hours.    CETIRIZINE (ZYRTEC) 10 MG TABLET    Take 10 mg by mouth once daily.   Discontinued Medications    AMOXICILLIN (AMOXIL) 875 MG TABLET    Take 1 tablet (875 mg total) by mouth 2 (two) times daily.    AZELASTINE (ASTELIN) 137 MCG (0.1 %) NASAL SPRAY    2 sprays (274 mcg total) by Nasal route 2 (two) times daily.    FLUTICASONE PROPIONATE (FLONASE) 50 MCG/ACTUATION NASAL SPRAY    2 sprays (100 mcg total) by Each Nostril route once daily.     1. Elevated blood pressure reading  Overview:  -asymptomatic and normal on repeat readings      2. Abnormal EKG  Overview:  -nonspecific ST T-wave changes  -Follow-up stress echocardiogram May 2023 nonischemic      3. Displaced comminuted fracture of left patella, initial encounter for closed fracture  Overview:  Dec 2021 - [facility]      4. Colon cancer screening  Comments:  Dr. Ruth - [facility]  Orders:  -     Ambulatory referral/consult to Endo Procedure ; Future; Expected date: 05/13/2023    5. Malignant neoplasm of small intestine, unspecified  Overview:  -circa 2014 [facility], path and records NA  -Dr. Ruth - [facility]    Assessment & Plan:  Ask patient to try to get outside record concerning cell type      6. Gallbladder polyp  Overview:  -noted on report on [facility] summary (care everywhere)    Orders:  -     US Abdomen Limited; Future; Expected date: 05/12/2023      See meds, orders, follow up, routing and instructions sections of encounter and AVS. Discussed with patient and provided on AVS.    Discussed diet and exercise  as therapeutic modalities for metabolic and other conditions. Provided patient information, which are included as links on the AVS for detailed information.    Lab Results   Component Value Date     04/01/2023    K 4.3 04/01/2023     04/01/2023    BUN 10 04/01/2023    CREATININE 0.6 04/01/2023    GLU 83 04/01/2023    MG 2.2 04/11/2011    AST 13 04/01/2023    ALT 11 04/01/2023    ALBUMIN 4.1 04/01/2023    PROT 7.0 04/01/2023    BILITOT 0.4 04/01/2023    CHOL 206 (H) 04/01/2023    HDL 53 04/01/2023    LDLCALC 129.8 04/01/2023    TRIG 116 04/01/2023    WBC 8.22 04/01/2023    HGB 13.5 04/01/2023    HCT 42.2 04/01/2023     04/01/2023    TSH 1.446 04/01/2023       Await query concerning echocardiogram findings, from reading cardiologist.

## 2024-04-24 ENCOUNTER — TELEPHONE (OUTPATIENT)
Dept: DERMATOLOGY | Facility: CLINIC | Age: 69
End: 2024-04-24
Payer: MEDICARE

## 2024-04-24 NOTE — TELEPHONE ENCOUNTER
----- Message from Jorge Aguiar sent at 4/24/2024  4:20 PM CDT -----  Contact: Haylie  The pt is needing a call back in regards to some concerns she has. Please give a call back at [phone]

## (undated) DEVICE — BLANKET HYPER ADULT 24X60IN

## (undated) DEVICE — SEE MEDLINE ITEM 146271

## (undated) DEVICE — SPONGE COTTON TRAY 4X4IN

## (undated) DEVICE — BANDAGE ACE ELASTIC 6"

## (undated) DEVICE — SPONGE GAUZE 16PLY 4X4

## (undated) DEVICE — GLOVE BIOGEL SKINSENSE PI 6.5

## (undated) DEVICE — SEE MEDLINE ITEM 146298

## (undated) DEVICE — PAD CAST SPECIALIST STRL 6

## (undated) DEVICE — NDL 18GA X1 1/2 REG BEVEL

## (undated) DEVICE — TOURNIQUET SB QC DP 34X4IN

## (undated) DEVICE — ELECTRODE REM PLYHSV RETURN 9

## (undated) DEVICE — TRAY FOLEY 16FR INFECTION CONT

## (undated) DEVICE — PADDING CAST 6IN DELTA ROLL

## (undated) DEVICE — GOWN B1 X-LG X-LONG

## (undated) DEVICE — GLOVE BIOGEL SKINSENSE PI 8.5

## (undated) DEVICE — APPLICATOR CHLORAPREP ORN 26ML

## (undated) DEVICE — PADDING CAST SPECIALIST 6X4YD

## (undated) DEVICE — SEE MEDLINE ITEM 153151

## (undated) DEVICE — Device

## (undated) DEVICE — SYR 50CC LL

## (undated) DEVICE — SOL IRR NACL .9% 1000CC

## (undated) DEVICE — SEE MEDLINE ITEM 152523

## (undated) DEVICE — STOCKINETTE TUBULAR 2PL 6 X 4

## (undated) DEVICE — GLOVE BIOGEL SKINSENSE PI 7.0

## (undated) DEVICE — BLADE SURG STAINLESS STEEL #15

## (undated) DEVICE — DRAPE STERI U-SHAPED 47X51IN

## (undated) DEVICE — KIT TOTAL HIP OPTIVAC

## (undated) DEVICE — PAD ABD 8X10 STERILE

## (undated) DEVICE — STAPLER SKIN ROTATING HEAD

## (undated) DEVICE — COVER BACK TABLE 72X21

## (undated) DEVICE — DRAPE INCISE IOBAN 2 23X17IN

## (undated) DEVICE — MASK FLYTE HOOD PEEL AWAY

## (undated) DEVICE — DRESSING N ADH OIL EMUL 3X8 3S

## (undated) DEVICE — GAUZE SPONGE 4X4 12PLY

## (undated) DEVICE — SUT VICRYL 2-0 8-18 CP-2

## (undated) DEVICE — BLADE SAW SAG 25.40MM X 1.27MM

## (undated) DEVICE — SOL 9P NACL IRR PIC IL

## (undated) DEVICE — UNDERGLOVE BIOGEL PI SZ 6.5 LF

## (undated) DEVICE — PULSAVAC ZIMMER

## (undated) DEVICE — DRESSING ADAPTIC N ADH 3X8IN

## (undated) DEVICE — DRAPE SURG W/TWL 17 5/8X23

## (undated) DEVICE — DRESSING N ADH OIL EMUL 3X8

## (undated) DEVICE — SUT VICRYL+ 1 CTX 18IN VIOL